# Patient Record
Sex: FEMALE | Race: WHITE
[De-identification: names, ages, dates, MRNs, and addresses within clinical notes are randomized per-mention and may not be internally consistent; named-entity substitution may affect disease eponyms.]

---

## 2021-10-27 ENCOUNTER — HOSPITAL ENCOUNTER (INPATIENT)
Dept: HOSPITAL 33 - ED | Age: 76
LOS: 5 days | Discharge: NURSING, REHAB OR LONG-TERM CARE | DRG: 638 | End: 2021-11-01
Attending: FAMILY MEDICINE | Admitting: FAMILY MEDICINE
Payer: MEDICARE

## 2021-10-27 DIAGNOSIS — Z79.899: ICD-10-CM

## 2021-10-27 DIAGNOSIS — R10.9: ICD-10-CM

## 2021-10-27 DIAGNOSIS — E87.6: ICD-10-CM

## 2021-10-27 DIAGNOSIS — R11.2: ICD-10-CM

## 2021-10-27 DIAGNOSIS — E11.00: Primary | ICD-10-CM

## 2021-10-27 DIAGNOSIS — F69: ICD-10-CM

## 2021-10-27 DIAGNOSIS — Z86.73: ICD-10-CM

## 2021-10-27 DIAGNOSIS — E78.5: ICD-10-CM

## 2021-10-27 DIAGNOSIS — Z79.4: ICD-10-CM

## 2021-10-27 DIAGNOSIS — I10: ICD-10-CM

## 2021-10-27 DIAGNOSIS — R10.84: ICD-10-CM

## 2021-10-27 DIAGNOSIS — Z20.822: ICD-10-CM

## 2021-10-27 DIAGNOSIS — E87.0: ICD-10-CM

## 2021-10-27 DIAGNOSIS — E11.65: ICD-10-CM

## 2021-10-27 LAB
ALBUMIN SERPL-MCNC: 4.7 G/DL (ref 3.5–5)
ALP SERPL-CCNC: 180 U/L (ref 38–126)
ALT SERPL-CCNC: 38 U/L (ref 0–35)
ANION GAP SERPL CALC-SCNC: 10.6 MEQ/L (ref 5–15)
ANION GAP SERPL CALC-SCNC: 14.1 MEQ/L (ref 5–15)
ANION GAP SERPL CALC-SCNC: 25 MEQ/L (ref 5–15)
ANION GAP SERPL CALC-SCNC: 34.9 MEQ/L (ref 5–15)
ARTERIAL PATENCY WRIST A: YES
AST SERPL QL: 27 U/L (ref 14–36)
BASE EXCESS BLDA CALC-SCNC: 8.1 MMOL/L (ref -2–2)
BASE EXCESS BLDV CALC-SCNC: -7 MMOL/L (ref -2–2)
BASOPHILS # BLD AUTO: 0.03 10*3/UL (ref 0–0.4)
BASOPHILS NFR BLD AUTO: 0.2 % (ref 0–0.4)
BILIRUB BLD-MCNC: 0.7 MG/DL (ref 0.2–1.3)
BUN SERPL-MCNC: 32 MG/DL (ref 7–17)
BUN SERPL-MCNC: 34 MG/DL (ref 7–17)
BUN SERPL-MCNC: 34 MG/DL (ref 7–17)
BUN SERPL-MCNC: 37 MG/DL (ref 7–17)
CALCIUM SPEC-MCNC: 10 MG/DL (ref 8.4–10.2)
CALCIUM SPEC-MCNC: 8.7 MG/DL (ref 8.4–10.2)
CALCIUM SPEC-MCNC: 8.8 MG/DL (ref 8.4–10.2)
CALCIUM SPEC-MCNC: 9 MG/DL (ref 8.4–10.2)
CHLORIDE SERPL-SCNC: 103 MMOL/L (ref 98–107)
CHLORIDE SERPL-SCNC: 105 MMOL/L (ref 98–107)
CHLORIDE SERPL-SCNC: 106 MMOL/L (ref 98–107)
CHLORIDE SERPL-SCNC: 93 MMOL/L (ref 98–107)
CO2 SERPL-SCNC: 18 MMOL/L (ref 22–30)
CO2 SERPL-SCNC: 20 MMOL/L (ref 22–30)
CO2 SERPL-SCNC: 31 MMOL/L (ref 22–30)
CO2 SERPL-SCNC: 33 MMOL/L (ref 22–30)
COHGB BLD-MCNC: 1.4 % THGB (ref 0–6.9)
COHGB MFR BLDV: 6.9 % T HGB (ref 0–6.9)
CREAT SERPL-MCNC: 0.78 MG/DL (ref 0.52–1.04)
CREAT SERPL-MCNC: 0.85 MG/DL (ref 0.52–1.04)
CREAT SERPL-MCNC: 0.93 MG/DL (ref 0.52–1.04)
CREAT SERPL-MCNC: 1.04 MG/DL (ref 0.52–1.04)
EOSINOPHIL # BLD AUTO: 0.01 10*3/UL (ref 0–0.5)
GAS PNL BLDA: 11.4
GAS PNL BLDA: 37 C
GFR SERPLBLD BASED ON 1.73 SQ M-ARVRAT: 54.5 ML/MIN
GFR SERPLBLD BASED ON 1.73 SQ M-ARVRAT: > 60 ML/MIN
GLUCOSE SERPL-MCNC: 138 MG/DL (ref 74–106)
GLUCOSE SERPL-MCNC: 234 MG/DL (ref 74–106)
GLUCOSE SERPL-MCNC: 503 MG/DL (ref 74–106)
GLUCOSE SERPL-MCNC: 713 MG/DL (ref 74–106)
GLUCOSE UR-MCNC: >=500 MG/DL
GLUCOSE UR-MCNC: >=500 MG/DL
HCO3 BLDA-SCNC: 33.4 MMOL/L (ref 22–28)
HCO3 BLDV-SCNC: 19.7 MEQ/L (ref 22–28)
HCT VFR BLD AUTO: 40.4 % (ref 35–47)
HGB BLD-MCNC: 12.6 GM/DL (ref 12–16)
HGB BLDV-MCNC: 13.2 G/DL
INHALED O2 CONCENTRATION: 21 %
INHALED O2 CONCENTRATION: 21 %
LYMPHOCYTES # SPEC AUTO: 0.98 10*3/UL (ref 1–4.6)
MAGNESIUM SERPL-MCNC: 2.3 MG/DL (ref 1.6–2.3)
MAGNESIUM SERPL-MCNC: 2.3 MG/DL (ref 1.6–2.3)
MCH RBC QN AUTO: 30.1 PG (ref 26–32)
MCHC RBC AUTO-ENTMCNC: 31.2 G/DL (ref 32–36)
METHGB MFR BLDA: 0.9 % (ref 1.4–1.5)
MONOCYTES # BLD AUTO: 1 10*3/UL (ref 0–1.3)
O2 A-A PPRESDIFF RESPIRATORY: 25 MM[HG]
PCO2 BLDA: 48 MMHG (ref 35–45)
PCO2 BLDV: 43 MM/HG (ref 42–55)
PHOSPHATE SERPL-SCNC: 2.3 MG/DL (ref 2.5–4.5)
PLATELET # BLD AUTO: 204 K/MM3 (ref 150–450)
PO2 BLDA: 65 MMHG (ref 75–100)
PO2 BLDV: 44 MM/HG (ref 25–40)
POTASSIUM BLDA-SCNC: 3.7 MMOL/L (ref 3.5–5.1)
POTASSIUM BLDV-SCNC: 6.8 MMOL/L (ref 3.5–5.1)
POTASSIUM SERPLBLD-SCNC: 4.4 MMOL/L (ref 3.5–5.1)
POTASSIUM SERPLBLD-SCNC: 4.4 MMOL/L (ref 3.5–5.1)
POTASSIUM SERPLBLD-SCNC: 4.5 MMOL/L (ref 3.5–5.1)
POTASSIUM SERPLBLD-SCNC: 5.3 MMOL/L (ref 3.5–5.1)
PROT SERPL-MCNC: 7.4 G/DL (ref 6.3–8.2)
PROT UR STRIP-MCNC: 30 MG/DL
PROT UR STRIP-MCNC: 30 MG/DL
RBC # BLD AUTO: 4.19 M/MM3 (ref 4.1–5.4)
RBC #/AREA URNS HPF: (no result) /HPF (ref 0–2)
RBC #/AREA URNS HPF: (no result) /HPF (ref 0–2)
SAO2 % BLDA: 93 G/DF (ref 94–100)
SAO2 % BLDA: 95.2 % (ref 95–100)
SAO2 % BLDV: 75.9 % (ref 95–100)
SODIUM SERPL-SCNC: 142 MMOL/L (ref 137–145)
SODIUM SERPL-SCNC: 142 MMOL/L (ref 137–145)
SODIUM SERPL-SCNC: 145 MMOL/L (ref 137–145)
SODIUM SERPL-SCNC: 145 MMOL/L (ref 137–145)
SPECIMEN SOURCE: (no result)
WBC # BLD AUTO: 19.4 K/MM3 (ref 4–10.5)
WBC #/AREA URNS HPF: (no result) /HPF (ref 0–5)
WBC #/AREA URNS HPF: (no result) /HPF (ref 0–5)

## 2021-10-27 PROCEDURE — 74176 CT ABD & PELVIS W/O CONTRAST: CPT

## 2021-10-27 PROCEDURE — 82805 BLOOD GASES W/O2 SATURATION: CPT

## 2021-10-27 PROCEDURE — 83605 ASSAY OF LACTIC ACID: CPT

## 2021-10-27 PROCEDURE — 99285 EMERGENCY DEPT VISIT HI MDM: CPT

## 2021-10-27 PROCEDURE — 83036 HEMOGLOBIN GLYCOSYLATED A1C: CPT

## 2021-10-27 PROCEDURE — 84100 ASSAY OF PHOSPHORUS: CPT

## 2021-10-27 PROCEDURE — 92610 EVALUATE SWALLOWING FUNCTION: CPT

## 2021-10-27 PROCEDURE — 80048 BASIC METABOLIC PNL TOTAL CA: CPT

## 2021-10-27 PROCEDURE — 36000 PLACE NEEDLE IN VEIN: CPT

## 2021-10-27 PROCEDURE — 82947 ASSAY GLUCOSE BLOOD QUANT: CPT

## 2021-10-27 PROCEDURE — 87086 URINE CULTURE/COLONY COUNT: CPT

## 2021-10-27 PROCEDURE — 70450 CT HEAD/BRAIN W/O DYE: CPT

## 2021-10-27 PROCEDURE — 84133 ASSAY OF URINE POTASSIUM: CPT

## 2021-10-27 PROCEDURE — 83935 ASSAY OF URINE OSMOLALITY: CPT

## 2021-10-27 PROCEDURE — 96365 THER/PROPH/DIAG IV INF INIT: CPT

## 2021-10-27 PROCEDURE — 96374 THER/PROPH/DIAG INJ IV PUSH: CPT

## 2021-10-27 PROCEDURE — 85025 COMPLETE CBC W/AUTO DIFF WBC: CPT

## 2021-10-27 PROCEDURE — 96361 HYDRATE IV INFUSION ADD-ON: CPT

## 2021-10-27 PROCEDURE — 84484 ASSAY OF TROPONIN QUANT: CPT

## 2021-10-27 PROCEDURE — 83690 ASSAY OF LIPASE: CPT

## 2021-10-27 PROCEDURE — 99291 CRITICAL CARE FIRST HOUR: CPT

## 2021-10-27 PROCEDURE — 83930 ASSAY OF BLOOD OSMOLALITY: CPT

## 2021-10-27 PROCEDURE — 96360 HYDRATION IV INFUSION INIT: CPT

## 2021-10-27 PROCEDURE — 71045 X-RAY EXAM CHEST 1 VIEW: CPT

## 2021-10-27 PROCEDURE — 80053 COMPREHEN METABOLIC PANEL: CPT

## 2021-10-27 PROCEDURE — 82803 BLOOD GASES ANY COMBINATION: CPT

## 2021-10-27 PROCEDURE — 84300 ASSAY OF URINE SODIUM: CPT

## 2021-10-27 PROCEDURE — 83735 ASSAY OF MAGNESIUM: CPT

## 2021-10-27 PROCEDURE — 96375 TX/PRO/DX INJ NEW DRUG ADDON: CPT

## 2021-10-27 PROCEDURE — 36600 WITHDRAWAL OF ARTERIAL BLOOD: CPT

## 2021-10-27 PROCEDURE — 82375 ASSAY CARBOXYHB QUANT: CPT

## 2021-10-27 PROCEDURE — 36415 COLL VENOUS BLD VENIPUNCTURE: CPT

## 2021-10-27 PROCEDURE — 82308 ASSAY OF CALCITONIN: CPT

## 2021-10-27 PROCEDURE — 85027 COMPLETE CBC AUTOMATED: CPT

## 2021-10-27 PROCEDURE — 81001 URINALYSIS AUTO W/SCOPE: CPT

## 2021-10-27 PROCEDURE — 94760 N-INVAS EAR/PLS OXIMETRY 1: CPT

## 2021-10-27 PROCEDURE — U0003 INFECTIOUS AGENT DETECTION BY NUCLEIC ACID (DNA OR RNA); SEVERE ACUTE RESPIRATORY SYNDROME CORONAVIRUS 2 (SARS-COV-2) (CORONAVIRUS DISEASE [COVID-19]), AMPLIFIED PROBE TECHNIQUE, MAKING USE OF HIGH THROUGHPUT TECHNOLOGIES AS DESCRIBED BY CMS-2020-01-R: HCPCS

## 2021-10-27 RX ADMIN — POTASSIUM CHLORIDE, DEXTROSE MONOHYDRATE AND SODIUM CHLORIDE SCH MLS/HR: 150; 5; 450 INJECTION, SOLUTION INTRAVENOUS at 20:39

## 2021-10-27 RX ADMIN — PANTOPRAZOLE SODIUM SCH MG: 40 INJECTION, POWDER, FOR SOLUTION INTRAVENOUS at 09:50

## 2021-10-27 RX ADMIN — CEFEPIME HYDROCHLORIDE SCH MLS/HR: 2 INJECTION, POWDER, FOR SOLUTION INTRAVENOUS at 12:25

## 2021-10-27 RX ADMIN — ONDANSETRON PRN MG: 2 INJECTION, SOLUTION INTRAMUSCULAR; INTRAVENOUS at 19:46

## 2021-10-27 RX ADMIN — POTASSIUM CHLORIDE, DEXTROSE MONOHYDRATE AND SODIUM CHLORIDE SCH MLS/HR: 150; 5; 450 INJECTION, SOLUTION INTRAVENOUS at 13:36

## 2021-10-27 RX ADMIN — ENOXAPARIN SODIUM SCH MG: 100 INJECTION SUBCUTANEOUS at 21:40

## 2021-10-27 RX ADMIN — CEFEPIME HYDROCHLORIDE SCH MLS/HR: 2 INJECTION, POWDER, FOR SOLUTION INTRAVENOUS at 18:23

## 2021-10-27 RX ADMIN — HYDRALAZINE HYDROCHLORIDE PRN MG: 20 INJECTION INTRAMUSCULAR; INTRAVENOUS at 22:36

## 2021-10-27 NOTE — XRAY
Indication: Cough.



Comparison: February 4, 2020.



Portable chest remains hyperinflated and clear.  Heart not enlarged for AP

portable technique.  Bony thorax intact again with osteopenia, degenerative

changes, and scoliosis.  No new/acute findings.

## 2021-10-27 NOTE — XRAY
Indication: Abdomen pain, nausea, and vomiting.  Dementia.



Multiple contiguous axial images obtained through the abdomen and pelvis

without contrast.



Comparison: None



Lung bases are clear of infiltrate and effusion.  Heart borderline enlarged.



Noncontrasted stomach and bowel loops appear nonobstructed.  Sigmoid

diverticulosis without diverticulitis.  Minimal/mild scattered colonic fecal

debris greatest in the sigmoid with mild rectal impaction.  2.8 cm left renal

exophytic cyst.  Urinary bladder is moderately distended concerning for all

objection versus neurogenic bladder.  No free fluid/air.



Remaining liver, gallbladder, pancreas, spleen, adrenal glands, kidneys,

ureters, bladder, and uterus are unremarkable for noncontrast exam.  Moderate

scattered aortoiliac calcifications without AAA.



Osseous structures intact with mild osteopenia, mild degenerative changes

throughout the spine, and inferior L2 Schmorl node.  Small left inguinal

hernia with knuckle of small bowel loop partially herniating.



Impression:

1.  Mild fecal stasis with mild rectal impaction.

2.  Incidental borderline cardiomegaly, sigmoid diverticulosis, left renal

cyst, chronic bony findings, and small left inguinal hernia.

3.  Remaining CT abdomen/pelvis without contrast exam is negative.



Comment: Preliminary interpretation made by [facility].  No critical discrepancy.

## 2021-10-27 NOTE — ERPHSYRPT
- History of Present Illness


Time Seen by Provider: 10/27/21 04:34


Historian: patient, EMS, nursing home records


Exam Limitations: clinical condition


Physician History: 





76 years old female with history of type 1 diabetes mellitus, hypertension, 

hyperlipidemia, dementia with baseline confusion is brought in the ER with chief

complaint of nausea vomiting with elevated blood glucose and refusal to take 

insulin.  As per report patient has multiple episodes of vomiting and 

generalized abdominal pain without any distention.  No diarrhea reported.  No 

fever or chills.  No difficulty breathing.  Patient blood glucose is 525 on 

presentation in the ER.  Further history is limited secondary to her condition.


Allergies/Adverse Reactions: 








No Known Drug Allergies Allergy (Unverified 10/27/21 04:56)


   





Home Medications: 








Acetaminophen 325 mg*** [Tylenol 325 mg***] 650 mg PO Q4HPRN PRN 10/27/21 

[History]


Aspirin EC 81 mg*** [Ecotrin 81 mg***] 81 mg PO DAILY 10/27/21 [History]


Atorvastatin Calcium 40 mg PO HS 10/27/21 [History]


Clonidine HCl 0.2 mg PO BID 10/27/21 [History]


Donepezil HCl 10 mg*** [Aricept 10 MG***] 10 mg PO HS 10/27/21 [History]


Glucagon 1 mg*** [GlucaGen 1 MG***] 1 mg IM UD 10/27/21 [History]


Hydralazine HCl 10 mg PO TID 10/27/21 [History]


Insulin Glargine** [Lantus Insulin**] 6 units SQ HS 10/27/21 [History]


Insulin Lispro [Humalog] 8 units SQ 1200,1700 10/27/21 [History]


Insulin Lispro [Humalog] 18 unit SQ 0800 10/27/21 [History]


Lisinopril 40 mg PO DAILY 10/27/21 [History]


Magnesium Hydroxide 30 ml*** [Milk of Magnesia 30 ml***] 30 ml PO DAILY PRN PRN 

10/27/21 [History]


Magnesium Oxide [Mag-Oxide] 200 mg PO DAILY 10/27/21 [History]


Memantine HCl 5 mg PO UD 10/27/21 [History]


Metoprolol Succinate 50 mg*** [Toprol Xl 50 MG***] 50 mg PO BID 10/27/21 

[History]


Multivit,Calc,Mins/Iron/Folic [Therems-M Tablet] 1 tab PO DAILY 10/27/21 

[History]


Nitroglycerin 0.4 mg Tablet*** [Nitrostat 0.4 MG Tablet***] 0.4 mg SL Q5MIN PRN 

MR X 3 PRN 10/27/21 [History]


Ondansetron ODT 4 MG*** [Zofran Odt 4 mg***] 4 mg PO Q6H PRN PRN 10/27/21 

[History]


Potassium Chloride 10 Meq Tab* [Klor Con 10 MEQ***] 10 meq PO DAILY 10/27/21 

[History]


Sertraline HCl 50 mg** [Zoloft 50 mg Tablet**] 50 mg PO HS 10/27/21 [History]


Vit A/Vit C/Vit E/Zinc/Copper [Preservision Areds Softgel] 1 cap PO DAILY 

10/27/21 [History]








- Review of Systems


All Other Systems: Unable due to dementia





- Nursing Vital Signs


Nursing Vital Signs: 


                               Initial Vital Signs











Temperature  97.9 F   10/27/21 04:20


 


Pulse Rate  91 H  10/27/21 04:20


 


Respiratory Rate  20   10/27/21 04:20


 


Blood Pressure  137/90   10/27/21 04:20


 


O2 Sat by Pulse Oximetry  97   10/27/21 04:20








                                   Pain Scale











Pain Intensity                 0

















- Physical Exam


General Appearance: no apparent distress, alert


Eye Exam: PERRL/EOMI


Ears, Nose, Throat Exam: normal ENT inspection, TMs normal


Neck Exam: normal inspection, non-tender, supple


Respiratory Exam: normal breath sounds, lungs clear


Cardiovascular Exam: regular rate/rhythm, normal heart sounds


Gastrointestinal/Abdomen Exam: soft, normal bowel sounds, tenderness 

(Generalized)


Back Exam: normal inspection


Extremity Exam: normal inspection, pelvis stable


Neurologic Exam: alert, sensation nml, No oriented x 3, No normal mood/affect, 

No motor deficits


Skin Exam: normal color


**SpO2 Interpretation**: normal


SpO2: 97


O2 Delivery: Room Air





- Course


EKG Interpreted by Me: RATE (82), Sinus Rhythm, NORMAL AXIS, prolonged QT 

interval, Other (ST depression in lateral leads/inferior leads.)


Ordered Tests: 


                               Active Orders 24 hr











 Category Date Time Status


 


 CBC W DIFF AM.LAB Lab  10/28/21 04:15 Completed


 


 CMP AM.LAB Lab  10/28/21 04:15 Completed








Medication Summary











Generic Name Dose Route Start Last Admin





  Trade Name Freq  PRN Reason Stop Dose Admin


 


Acetaminophen  650 mg  10/27/21 20:21  10/27/21 20:40





  Acetaminophen 650 Mg Supp.Rect  TN  11/26/21 20:20  650 mg





  Q6H PRN PRN   Administration





  PAIN AND/OR FEVER  


 


Enoxaparin Sodium  40 mg  10/27/21 22:00  10/28/21 08:59





  Enoxaparin Sodium*** 40 Mg/0.4 Ml Syringe  SQ  11/26/21 21:59  40 mg





  Q12H ALLEN   Administration


 


Hydralazine HCl  20 mg  10/27/21 20:23  10/28/21 17:03





  Hydralazine Hcl 20 Mg/Ml Vial  IV  11/26/21 20:22  20 mg





  Q6H PRN PRN   Administration





  HYPERTENSION  


 


Piperacillin Sod/Tazobactam  100 mls @ 200 mls/hr  10/27/21 12:00  10/28/21 

17:08





  Sod 2.25 gm/ Sodium Chloride  IV  11/26/21 11:59  200 mls/hr





  Q6HT ALLEN   Administration


 


Sodium Chloride  1,000 mls @ 50 mls/hr  10/28/21 03:27  10/28/21 03:36





  Sodium Chloride 0.9% 1000 Ml  IV  11/27/21 03:26  50 mls/hr





  .Q20H ALLEN   Administration


 


Insulin Human Lispro  0 unit  10/28/21 03:26  10/28/21 16:45





  Insulin Lispro 1 Unit  SQ  11/27/21 03:25  5 unit





  UD PRN   Administration





  HYPERGLYCEMIA  


 


Labetalol HCl  20 mg  10/28/21 14:00  10/28/21 12:27





  Labetalol Hcl 20 Mg/4 Ml Disp.Syringe  IV  11/27/21 03:29  20 mg





  Q8HT ALLEN   Administration


 


Ondansetron HCl  4 mg  10/27/21 08:55  10/28/21 17:34





  Ondansetron Hcl 4 Mg/2 Ml Vial  IV  11/26/21 08:54  4 mg





  Q6H PRN PRN   Administration





  NAUSEA/VOMITING  


 


Pantoprazole Sodium  40 mg  10/27/21 10:00  10/28/21 08:59





  Pantoprazole 40 Mg Vial  IV  11/26/21 09:59  40 mg





  Q24H10 ALLEN   Administration














Discontinued Medications














Generic Name Dose Route Start Last Admin





  Trade Name Qing  PRN Reason Stop Dose Admin


 


Acetaminophen  Confirm  10/27/21 20:31 





  Acetaminophen 325mg Suppository  Administered  10/27/21 20:32 





  Dose  





  650 mg  





  .ROUTE  





  .STK-MED ONE  


 


Albuterol/Ipratropium  3 ml  10/27/21 08:55 





  Ipratropium/Albuterol Sulfate 3 Ml Ampul.Neb  IH  11/26/21 08:54 





  Q4HPRN PRN  





  SHORTNESS OF BREATH/WHEEZING  


 


Calcium Gluconate  Confirm  10/27/21 05:49 





  Calcium Gluconate 1000 Mg/10 Ml Vial  Administered  10/27/21 05:50 





  Dose  





  1,000 mg  





  IV  





  .STK-MED ONE  


 


Dextrose  25 ml  10/27/21 18:03  10/27/21 18:04





  Dextrose 50%-Water 50 Ml Abboject  IV  10/27/21 18:04  25 ml





  STAT ONE   Administration


 


Dextrose  Confirm  10/27/21 18:04 





  Dextrose 50%-Water 50 Ml Abboject  Administered  10/27/21 18:05 





  Dose  





  50 ml  





  IV  





  .STK-MED ONE  


 


Enoxaparin Sodium  40 mg  10/27/21 10:00  10/27/21 09:50





  Enoxaparin Sodium*** 40 Mg/0.4 Ml Syringe  SQ  11/26/21 09:59  40 mg





  DAILY ALLEN   Administration


 


Enoxaparin Sodium  40 mg  10/27/21 22:00 





  Enoxaparin Sodium*** 40 Mg/0.4 Ml Syringe  SQ  11/26/21 21:59 





  DAILY ALLEN  


 


Sodium Chloride  1,000 mls @ 999 mls/hr  10/27/21 04:42  10/27/21 06:53





  Sodium Chloride 0.9% 1000 Ml  IV  10/27/21 05:42  Infused





  .Q1H1M STA   Infusion


 


Sodium Chloride  500 mls @ 500 mls/hr  10/27/21 05:22  10/27/21 06:52





  Sodium Chloride 0.9% 500 Ml  IV  10/27/21 06:21  Infused





  .Q1H ONE   Infusion


 


Sodium Chloride  Confirm  10/27/21 05:23 





  Sodium Chloride 0.9% 1000 Ml  Administered  10/27/21 05:24 





  Dose  





  1,000 mls @ ud  





  .ROUTE  





  .STK-MED ONE  


 


Sodium Chloride  500 mls @ 500 mls/hr  10/27/21 06:17  10/27/21 06:53





  Sodium Chloride 0.9% 500 Ml  IV  10/27/21 07:16  Infused





  .Q1H ONE   Infusion


 


Sodium Chloride  Confirm  10/27/21 06:18 





  Sodium Chloride 0.9% 500 Ml  Administered  10/27/21 06:19 





  Dose  





  500 mls @ ud  





  IV  





  .STK-MED ONE  


 


Sodium Chloride  Confirm  10/27/21 06:25 





  Sodium Chloride 0.9% 100 Ml Bag  Administered  10/27/21 06:26 





  Dose  





  100 mls @ ud  





  .ROUTE  





  .STK-MED ONE  


 


Piperacillin Sod/Tazobactam  100 mls @ 200 mls/hr  10/27/21 06:29  10/27/21 

06:40





  Sod 3.375 gm/ Sodium Chloride  IV  10/27/21 06:58  200 mls/hr





  STAT ONE   Administration


 


Insulin Human Regular 100 unit  100 mls @ 4.581 mls/hr  10/27/21 06:29  10/27/21

 06:39





  / Sodium Chloride  IV  10/28/21 04:18  0.1 unit/kg/hr





  .[id number] STA   4.581 mls/hr





    Administration





  Protocol  





  0.1 UNIT/KG/HR  


 


Sodium Chloride  Confirm  10/27/21 06:34 





  Sodium Chloride 100ml Mini-Bag Plus  Administered  10/27/21 06:35 





  Dose  





  100 mls @ ud  





  IV  





  .STK-MED ONE  


 


Sodium Chloride  1,000 mls @ 100 mls/hr  10/27/21 07:00  10/27/21 06:40





  Sodium Chloride 0.45% 1000 Ml  IV  11/26/21 06:59  100 mls/hr





  .Q10H ALLEN   Administration


 


Sodium Chloride  Confirm  10/27/21 06:36 





  Sodium Chloride 0.45% 1000 Ml  Administered  10/27/21 06:37 





  Dose  





  1,000 mls @ ud  





  IV  





  .STK-MED ONE  


 


Piperacillin Sod/Tazobactam  100 mls @ 200 mls/hr  10/27/21 12:00 





  Sod 3.375 gm/ Sodium Chloride  IV  10/30/21 11:59 





  Q6HT ALLEN  


 


Insulin Human Regular 100 unit  100 mls @ 4.581 mls/hr  10/27/21 09:30  10/27/21

 18:00





  / Sodium Chloride  IV  11/26/21 06:28  0.03 unit/kg/hr





  .[id number] ALLEN   1.5 mls/hr





    Titration





  Protocol  





  0.1 UNIT/KG/HR  


 


Sodium Chloride  1,000 mls @ 100 mls/hr  10/27/21 10:00  10/27/21 18:51





  Sodium Chloride 0.9% 1000 Ml  IV  11/26/21 09:59  Not Given





  .Q10H ALLEN  


 


Potassium Chloride/Dextrose/Sod Cl  1,000 mls @ 100 mls/hr  10/27/21 13:30  

10/27/21 20:39





  D5w/0.45ns W/ 20meq Kcl 1000 Ml  IV  11/26/21 13:29  150 mls/hr





  .Q10H ALLEN   Administration


 


Insulin Glargine  10 unit  10/28/21 18:45  10/27/21 18:43





  Insulin Glargine** 1 Unit  SQ  10/28/21 18:46  10 unit





  STAT ONE   Administration


 


Insulin Glargine  Confirm  10/27/21 18:43 





  Insulin Glargine** 1 Unit  Administered  10/27/21 18:44 





  Dose  





  10 unit  





  .ROUTE  





  .STK-MED ONE  


 


Insulin Human Regular  Confirm  10/27/21 06:25 





  Insulin Regular, Human 1 Unit  Administered  10/27/21 06:26 





  Dose  





  105 unit  





  .ROUTE  





  .STK-MED ONE  


 


Insulin Human Regular  4.5 unit  10/27/21 06:31  10/27/21 06:40





  Insulin Regular, Human 1 Unit  IV  10/27/21 06:32  4.5 unit





  STAT ONE   Administration


 


Labetalol HCl  10 mg  10/27/21 21:00  10/27/21 20:36





  Labetalol Hcl 20 Mg/4 Ml Disp.Syringe  IV  11/26/21 20:59  10 mg





  Q8H ALLEN   Administration


 


Labetalol HCl  20 mg  10/28/21 03:30  10/28/21 03:35





  Labetalol Hcl 20 Mg/4 Ml Disp.Syringe  IV  11/27/21 03:29  20 mg





  Q8H ALLEN   Administration


 


Labetalol HCl  Confirm  10/27/21 20:32 





  Labetalol Hcl 20 Mg/4 Ml Disp.Syringe  Administered  10/27/21 20:33 





  Dose  





  20 mg  





  IV  





  .STK-MED ONE  


 


Ondansetron HCl  4 mg  10/27/21 04:42  10/27/21 05:23





  Ondansetron Hcl 4 Mg/2 Ml Vial  IV  10/27/21 04:43  4 mg





  STAT ONE   Administration


 


Ondansetron HCl  Confirm  10/27/21 05:23 





  Ondansetron Hcl 4 Mg/2 Ml Vial  Administered  10/27/21 05:24 





  Dose  





  4 mg  





  .ROUTE  





  .STK-MED ONE  


 


Piperacillin Sod/Tazobactam Sod  Confirm  10/27/21 06:34 





  Piperacillin/Tazobactam Sodium 3.375 Gm Vial  Administered  10/27/21 06:35 





  Dose  





  3.375 gm  





  IV  





  .STK-MED ONE  











Lab/Rad Data: 


                           Laboratory Result Diagrams





                                 10/27/21 05:20 





                                 10/27/21 05:20 





                               Laboratory Results











  10/27/21 10/27/21 10/27/21 Range/Units





  08:38 07:38 07:02 


 


WBC     (4.0-10.5)  K/mm3


 


RBC     (4.1-5.4)  M/mm3


 


Hgb     (12.0-16.0)  gm/dl


 


Hct     (35-47)  %


 


MCV     ()  fl


 


MCH     (26-32)  pg


 


MCHC     (32-36)  g/dl


 


RDW     (11.5-14.0)  %


 


Plt Count     (150-450)  K/mm3


 


MPV     (7.5-11.0)  fl


 


Gran %     (36.0-66.0)  %


 


Eos # (Auto)     (0-0.5)  


 


Absolute Lymphs (auto)     (1.0-4.6)  


 


Absolute Monos (auto)     (0.0-1.3)  


 


Lymphocytes %     (24.0-44.0)  %


 


Monocytes %     (0.0-12.0)  %


 


Eosinophils %     (0.00-5.0)  %


 


Basophils %     (0.0-0.4)  %


 


Absolute Granulocytes     (1.4-6.9)  


 


Basophils #     (0-0.4)  


 


pO2/FiO2 Ratio     %


 


VBG pH     (7.32-7.42)  


 


VBG pCO2 at Pat Temp     (42-55)  mm/Hg


 


VBG pO2 at Pat Temp     (25-40)  mm/Hg


 


VBG HCO3     (22-28)  meq/L


 


VBG O2 Sat (Radha)     ()  


 


VBG Base Excess     (-2.0-2.0)  


 


VBG Hemoglobin     


 


VBG Carboxyhemoglobin     (0.0-6.9)  % T HGB


 


POC Potassium     (3.5-5.1)  


 


Sodium     (137-145)  mmol/L


 


Potassium     (3.5-5.1)  mmol/L


 


Chloride     ()  mmol/L


 


Carbon Dioxide     (22-30)  mmol/L


 


Anion Gap     (5-15)  MEQ/L


 


BUN     (7-17)  mg/dL


 


Creatinine     (0.52-1.04)  mg/dL


 


Estimated GFR     ML/MIN


 


Glucose     ()  mg/dL


 


POC Glucometer  491 H    (74 to 106)  mg/dL


 


Lactic Acid   3.5 H   (0.4-2.0)  


 


Calcium     (8.4-10.2)  mg/dL


 


Phosphorus     (2.5-4.5)  mg/dL


 


Magnesium     (1.6-2.3)  mg/dL


 


Total Bilirubin     (0.2-1.3)  mg/dL


 


AST     (14-36)  U/L


 


ALT     (0-35)  U/L


 


Alkaline Phosphatase     ()  U/L


 


Troponin I     (0.000-0.034)  ng/mL


 


Serum Total Protein     (6.3-8.2)  g/dL


 


Albumin     (3.5-5.0)  g/dL


 


Lipase     ()  U/L


 


Urine Color     (YELLOW)  


 


Urine Appearance     (CLEAR)  


 


Urine pH     (5-6)  


 


Ur Specific Gravity     (1.005-1.025)  


 


Urine Protein     (Negative)  


 


Urine Ketones     (NEGATIVE)  


 


Urine Blood     (0-5)  Jose/ul


 


Urine Nitrite     (NEGATIVE)  


 


Urine Bilirubin     (NEGATIVE)  


 


Urine Urobilinogen     (0-1)  mg/dL


 


Ur Leukocyte Esterase     (NEGATIVE)  


 


Urine WBC (Auto)     (0-5)  /HPF


 


Urine RBC (Auto)     (0-2)  /HPF


 


U Epithel Cells (Auto)     (FEW)  /HPF


 


Urine Bacteria (Auto)     (NEGATIVE)  /HPF


 


Urine Culture Reflexed     (NO)  


 


Urine Glucose     (NEGATIVE)  mg/dL


 


SARS-CoV-2 (PCR)    NEGATIVE  (NEGATIVE)  














  10/27/21 10/27/21 10/27/21 Range/Units





  05:30 05:30 05:30 


 


WBC     (4.0-10.5)  K/mm3


 


RBC     (4.1-5.4)  M/mm3


 


Hgb     (12.0-16.0)  gm/dl


 


Hct     (35-47)  %


 


MCV     ()  fl


 


MCH     (26-32)  pg


 


MCHC     (32-36)  g/dl


 


RDW     (11.5-14.0)  %


 


Plt Count     (150-450)  K/mm3


 


MPV     (7.5-11.0)  fl


 


Gran %     (36.0-66.0)  %


 


Eos # (Auto)     (0-0.5)  


 


Absolute Lymphs (auto)     (1.0-4.6)  


 


Absolute Monos (auto)     (0.0-1.3)  


 


Lymphocytes %     (24.0-44.0)  %


 


Monocytes %     (0.0-12.0)  %


 


Eosinophils %     (0.00-5.0)  %


 


Basophils %     (0.0-0.4)  %


 


Absolute Granulocytes     (1.4-6.9)  


 


Basophils #     (0-0.4)  


 


pO2/FiO2 Ratio    21.0  %


 


VBG pH    7.27 L  (7.32-7.42)  


 


VBG pCO2 at Pat Temp    43  (42-55)  mm/Hg


 


VBG pO2 at Pat Temp    44 H  (25-40)  mm/Hg


 


VBG HCO3    19.7 L  (22-28)  meq/L


 


VBG O2 Sat (Radha)    75.9 L  ()  


 


VBG Base Excess    -7.0 L  (-2.0-2.0)  


 


VBG Hemoglobin    13.2  


 


VBG Carboxyhemoglobin    6.9  (0.0-6.9)  % T HGB


 


POC Potassium    6.8 H*  (3.5-5.1)  


 


Sodium     (137-145)  mmol/L


 


Potassium     (3.5-5.1)  mmol/L


 


Chloride     ()  mmol/L


 


Carbon Dioxide     (22-30)  mmol/L


 


Anion Gap     (5-15)  MEQ/L


 


BUN     (7-17)  mg/dL


 


Creatinine     (0.52-1.04)  mg/dL


 


Estimated GFR     ML/MIN


 


Glucose     ()  mg/dL


 


POC Glucometer     (74 to 106)  mg/dL


 


Lactic Acid     (0.4-2.0)  


 


Calcium     (8.4-10.2)  mg/dL


 


Phosphorus  7.0 H    (2.5-4.5)  mg/dL


 


Magnesium     (1.6-2.3)  mg/dL


 


Total Bilirubin     (0.2-1.3)  mg/dL


 


AST     (14-36)  U/L


 


ALT     (0-35)  U/L


 


Alkaline Phosphatase     ()  U/L


 


Troponin I   0.044 H*   (0.000-0.034)  ng/mL


 


Serum Total Protein     (6.3-8.2)  g/dL


 


Albumin     (3.5-5.0)  g/dL


 


Lipase     ()  U/L


 


Urine Color     (YELLOW)  


 


Urine Appearance     (CLEAR)  


 


Urine pH     (5-6)  


 


Ur Specific Gravity     (1.005-1.025)  


 


Urine Protein     (Negative)  


 


Urine Ketones     (NEGATIVE)  


 


Urine Blood     (0-5)  Jose/ul


 


Urine Nitrite     (NEGATIVE)  


 


Urine Bilirubin     (NEGATIVE)  


 


Urine Urobilinogen     (0-1)  mg/dL


 


Ur Leukocyte Esterase     (NEGATIVE)  


 


Urine WBC (Auto)     (0-5)  /HPF


 


Urine RBC (Auto)     (0-2)  /HPF


 


U Epithel Cells (Auto)     (FEW)  /HPF


 


Urine Bacteria (Auto)     (NEGATIVE)  /HPF


 


Urine Culture Reflexed     (NO)  


 


Urine Glucose     (NEGATIVE)  mg/dL


 


SARS-CoV-2 (PCR)     (NEGATIVE)  














  10/27/21 10/27/21 10/27/21 Range/Units





  05:30 05:20 05:20 


 


WBC     (4.0-10.5)  K/mm3


 


RBC     (4.1-5.4)  M/mm3


 


Hgb     (12.0-16.0)  gm/dl


 


Hct     (35-47)  %


 


MCV     ()  fl


 


MCH     (26-32)  pg


 


MCHC     (32-36)  g/dl


 


RDW     (11.5-14.0)  %


 


Plt Count     (150-450)  K/mm3


 


MPV     (7.5-11.0)  fl


 


Gran %     (36.0-66.0)  %


 


Eos # (Auto)     (0-0.5)  


 


Absolute Lymphs (auto)     (1.0-4.6)  


 


Absolute Monos (auto)     (0.0-1.3)  


 


Lymphocytes %     (24.0-44.0)  %


 


Monocytes %     (0.0-12.0)  %


 


Eosinophils %     (0.00-5.0)  %


 


Basophils %     (0.0-0.4)  %


 


Absolute Granulocytes     (1.4-6.9)  


 


Basophils #     (0-0.4)  


 


pO2/FiO2 Ratio     %


 


VBG pH     (7.32-7.42)  


 


VBG pCO2 at Pat Temp     (42-55)  mm/Hg


 


VBG pO2 at Pat Temp     (25-40)  mm/Hg


 


VBG HCO3     (22-28)  meq/L


 


VBG O2 Sat (Radha)     ()  


 


VBG Base Excess     (-2.0-2.0)  


 


VBG Hemoglobin     


 


VBG Carboxyhemoglobin     (0.0-6.9)  % T HGB


 


POC Potassium     (3.5-5.1)  


 


Sodium     (137-145)  mmol/L


 


Potassium     (3.5-5.1)  mmol/L


 


Chloride     ()  mmol/L


 


Carbon Dioxide     (22-30)  mmol/L


 


Anion Gap     (5-15)  MEQ/L


 


BUN     (7-17)  mg/dL


 


Creatinine     (0.52-1.04)  mg/dL


 


Estimated GFR     ML/MIN


 


Glucose     ()  mg/dL


 


POC Glucometer     (74 to 106)  mg/dL


 


Lactic Acid  5.1 H    (0.4-2.0)  


 


Calcium     (8.4-10.2)  mg/dL


 


Phosphorus     (2.5-4.5)  mg/dL


 


Magnesium     (1.6-2.3)  mg/dL


 


Total Bilirubin     (0.2-1.3)  mg/dL


 


AST     (14-36)  U/L


 


ALT     (0-35)  U/L


 


Alkaline Phosphatase     ()  U/L


 


Troponin I     (0.000-0.034)  ng/mL


 


Serum Total Protein     (6.3-8.2)  g/dL


 


Albumin     (3.5-5.0)  g/dL


 


Lipase    17 L  ()  U/L


 


Urine Color   YELLOW   (YELLOW)  


 


Urine Appearance   CLEAR   (CLEAR)  


 


Urine pH   5.0   (5-6)  


 


Ur Specific Gravity   1.023   (1.005-1.025)  


 


Urine Protein   30   (Negative)  


 


Urine Ketones   SMALL   (NEGATIVE)  


 


Urine Blood   NEGATIVE   (0-5)  Jose/ul


 


Urine Nitrite   NEGATIVE   (NEGATIVE)  


 


Urine Bilirubin   NEGATIVE   (NEGATIVE)  


 


Urine Urobilinogen   NEGATIVE   (0-1)  mg/dL


 


Ur Leukocyte Esterase   NEGATIVE   (NEGATIVE)  


 


Urine WBC (Auto)   NONE   (0-5)  /HPF


 


Urine RBC (Auto)   NONE   (0-2)  /HPF


 


U Epithel Cells (Auto)   NONE   (FEW)  /HPF


 


Urine Bacteria (Auto)   NONE   (NEGATIVE)  /HPF


 


Urine Culture Reflexed   NO   (NO)  


 


Urine Glucose   >=500   (NEGATIVE)  mg/dL


 


SARS-CoV-2 (PCR)     (NEGATIVE)  














  10/27/21 10/27/21 Range/Units





  05:20 05:20 


 


WBC   19.4 H  (4.0-10.5)  K/mm3


 


RBC   4.19  (4.1-5.4)  M/mm3


 


Hgb   12.6  (12.0-16.0)  gm/dl


 


Hct   40.4  (35-47)  %


 


MCV   96.4  ()  fl


 


MCH   30.1  (26-32)  pg


 


MCHC   31.2 L  (32-36)  g/dl


 


RDW   13.2  (11.5-14.0)  %


 


Plt Count   204  (150-450)  K/mm3


 


MPV   13.5 H  (7.5-11.0)  fl


 


Gran %   89.6 H  (36.0-66.0)  %


 


Eos # (Auto)   0.01  (0-0.5)  


 


Absolute Lymphs (auto)   0.98 L  (1.0-4.6)  


 


Absolute Monos (auto)   1.00  (0.0-1.3)  


 


Lymphocytes %   5.0 L  (24.0-44.0)  %


 


Monocytes %   5.1  (0.0-12.0)  %


 


Eosinophils %   0.1  (0.00-5.0)  %


 


Basophils %   0.2  (0.0-0.4)  %


 


Absolute Granulocytes   17.40 H  (1.4-6.9)  


 


Basophils #   0.03  (0-0.4)  


 


pO2/FiO2 Ratio    %


 


VBG pH    (7.32-7.42)  


 


VBG pCO2 at Pat Temp    (42-55)  mm/Hg


 


VBG pO2 at Pat Temp    (25-40)  mm/Hg


 


VBG HCO3    (22-28)  meq/L


 


VBG O2 Sat (Radha)    ()  


 


VBG Base Excess    (-2.0-2.0)  


 


VBG Hemoglobin    


 


VBG Carboxyhemoglobin    (0.0-6.9)  % T HGB


 


POC Potassium    (3.5-5.1)  


 


Sodium  142   (137-145)  mmol/L


 


Potassium  5.3 H   (3.5-5.1)  mmol/L


 


Chloride  93 L   ()  mmol/L


 


Carbon Dioxide  20 L   (22-30)  mmol/L


 


Anion Gap  34.9 H   (5-15)  MEQ/L


 


BUN  34 H   (7-17)  mg/dL


 


Creatinine  1.04   (0.52-1.04)  mg/dL


 


Estimated GFR  54.5   ML/MIN


 


Glucose  713 H*   ()  mg/dL


 


POC Glucometer    (74 to 106)  mg/dL


 


Lactic Acid    (0.4-2.0)  


 


Calcium  10.0   (8.4-10.2)  mg/dL


 


Phosphorus    (2.5-4.5)  mg/dL


 


Magnesium  2.3   (1.6-2.3)  mg/dL


 


Total Bilirubin  0.70   (0.2-1.3)  mg/dL


 


AST  27   (14-36)  U/L


 


ALT  38 H   (0-35)  U/L


 


Alkaline Phosphatase  180 H   ()  U/L


 


Troponin I    (0.000-0.034)  ng/mL


 


Serum Total Protein  7.4   (6.3-8.2)  g/dL


 


Albumin  4.7   (3.5-5.0)  g/dL


 


Lipase    ()  U/L


 


Urine Color    (YELLOW)  


 


Urine Appearance    (CLEAR)  


 


Urine pH    (5-6)  


 


Ur Specific Gravity    (1.005-1.025)  


 


Urine Protein    (Negative)  


 


Urine Ketones    (NEGATIVE)  


 


Urine Blood    (0-5)  Jose/ul


 


Urine Nitrite    (NEGATIVE)  


 


Urine Bilirubin    (NEGATIVE)  


 


Urine Urobilinogen    (0-1)  mg/dL


 


Ur Leukocyte Esterase    (NEGATIVE)  


 


Urine WBC (Auto)    (0-5)  /HPF


 


Urine RBC (Auto)    (0-2)  /HPF


 


U Epithel Cells (Auto)    (FEW)  /HPF


 


Urine Bacteria (Auto)    (NEGATIVE)  /HPF


 


Urine Culture Reflexed    (NO)  


 


Urine Glucose    (NEGATIVE)  mg/dL


 


SARS-CoV-2 (PCR)    (NEGATIVE)  














- Progress


Progress: unchanged


Progress Note: 





10/27/21 06:47


She is given fluid bolus 30/kg for lactate 5.1, work-up showed white count of 19

 and a serum glucose 713 with gap of 34, mildly low bicarb 21, pH of 7.27.  

Potassium is 5.3.  Serum osmolality is around 330.  I believe patient is in HHS.

  She is started on insulin drip per protocol.  EKG showed diffuse ST depression

 especially in the lateral leads.  Troponins are negative.  She is given a dose 

of antibiotics, no UTI.  Chest x-ray reviewed by me did not reveal any obvious 

infiltrative process or any other acute pathology.  Official report is pending. 

 Discussed with Dr. Montes, reviewed history, work-up and agreed with starting 

her on insulin drip and since her corrected sodium is more than 150s, will con

tinue with half-normal saline rather than normal saline to avoid hypernatremia. 

 Patient is admitted.











Counseled pt/family regarding: lab results, diagnosis, rad results





- Departure


Departure Disposition: In-patient Admission


Clinical Impression: 


 Hyperosmolar hyperglycemic state (HHS), Lactic acidosis





Sepsis


Qualifiers:


 Sepsis type: sepsis due to unspecified organism Sepsis acute organ dysfunction 

status: unspecified Qualified Code(s): A41.9 - Sepsis, unspecified organism





Condition: Serious


Critical Care Time: Yes


Critical Care Time(excluding separately billable procedures): Critical 30-74 

mins (56)

## 2021-10-28 LAB
ALBUMIN SERPL-MCNC: 3.3 G/DL (ref 3.5–5)
ALP SERPL-CCNC: 102 U/L (ref 38–126)
ALT SERPL-CCNC: 29 U/L (ref 0–35)
ANION GAP SERPL CALC-SCNC: 15.2 MEQ/L (ref 5–15)
AST SERPL QL: 27 U/L (ref 14–36)
BASOPHILS # BLD AUTO: 0.02 10*3/UL (ref 0–0.4)
BASOPHILS NFR BLD AUTO: 0.1 % (ref 0–0.4)
BILIRUB BLD-MCNC: 0.5 MG/DL (ref 0.2–1.3)
BUN SERPL-MCNC: 21 MG/DL (ref 7–17)
CALCIUM SPEC-MCNC: 8.4 MG/DL (ref 8.4–10.2)
CHLORIDE SERPL-SCNC: 106 MMOL/L (ref 98–107)
CO2 SERPL-SCNC: 26 MMOL/L (ref 22–30)
CREAT SERPL-MCNC: 0.63 MG/DL (ref 0.52–1.04)
EOSINOPHIL # BLD AUTO: 0 10*3/UL (ref 0–0.5)
GFR SERPLBLD BASED ON 1.73 SQ M-ARVRAT: > 60 ML/MIN
GLUCOSE SERPL-MCNC: 336 MG/DL (ref 74–106)
HCT VFR BLD AUTO: 33.9 % (ref 35–47)
HGB BLD-MCNC: 10.4 GM/DL (ref 12–16)
LYMPHOCYTES # SPEC AUTO: 1.31 10*3/UL (ref 1–4.6)
MCH RBC QN AUTO: 29.1 PG (ref 26–32)
MCHC RBC AUTO-ENTMCNC: 30.7 G/DL (ref 32–36)
MONOCYTES # BLD AUTO: 0.72 10*3/UL (ref 0–1.3)
PLATELET # BLD AUTO: 160 K/MM3 (ref 150–450)
POTASSIUM SERPLBLD-SCNC: 3.9 MMOL/L (ref 3.5–5.1)
PROT SERPL-MCNC: 6 G/DL (ref 6.3–8.2)
RBC # BLD AUTO: 3.57 M/MM3 (ref 4.1–5.4)
SODIUM SERPL-SCNC: 143 MMOL/L (ref 137–145)
WBC # BLD AUTO: 16.3 K/MM3 (ref 4–10.5)

## 2021-10-28 RX ADMIN — CEFEPIME HYDROCHLORIDE SCH MLS/HR: 2 INJECTION, POWDER, FOR SOLUTION INTRAVENOUS at 17:08

## 2021-10-28 RX ADMIN — INSULIN LISPRO PRN UNIT: 100 INJECTION, SOLUTION INTRAVENOUS; SUBCUTANEOUS at 16:45

## 2021-10-28 RX ADMIN — INSULIN LISPRO PRN UNIT: 100 INJECTION, SOLUTION INTRAVENOUS; SUBCUTANEOUS at 03:36

## 2021-10-28 RX ADMIN — CEFEPIME HYDROCHLORIDE SCH MLS/HR: 2 INJECTION, POWDER, FOR SOLUTION INTRAVENOUS at 06:04

## 2021-10-28 RX ADMIN — LABETALOL HYDROCHLORIDE SCH MG: 5 INJECTION, SOLUTION INTRAVENOUS at 21:04

## 2021-10-28 RX ADMIN — ENOXAPARIN SODIUM SCH MG: 100 INJECTION SUBCUTANEOUS at 08:59

## 2021-10-28 RX ADMIN — HYDRALAZINE HYDROCHLORIDE PRN MG: 20 INJECTION INTRAMUSCULAR; INTRAVENOUS at 17:03

## 2021-10-28 RX ADMIN — ONDANSETRON PRN MG: 2 INJECTION, SOLUTION INTRAMUSCULAR; INTRAVENOUS at 23:02

## 2021-10-28 RX ADMIN — ENOXAPARIN SODIUM SCH MG: 100 INJECTION SUBCUTANEOUS at 21:03

## 2021-10-28 RX ADMIN — CEFEPIME HYDROCHLORIDE SCH MLS/HR: 2 INJECTION, POWDER, FOR SOLUTION INTRAVENOUS at 00:10

## 2021-10-28 RX ADMIN — ONDANSETRON PRN MG: 2 INJECTION, SOLUTION INTRAMUSCULAR; INTRAVENOUS at 17:34

## 2021-10-28 RX ADMIN — INSULIN LISPRO PRN UNIT: 100 INJECTION, SOLUTION INTRAVENOUS; SUBCUTANEOUS at 07:53

## 2021-10-28 RX ADMIN — LABETALOL HYDROCHLORIDE SCH MG: 5 INJECTION, SOLUTION INTRAVENOUS at 12:27

## 2021-10-28 RX ADMIN — PANTOPRAZOLE SODIUM SCH MG: 40 INJECTION, POWDER, FOR SOLUTION INTRAVENOUS at 08:59

## 2021-10-28 RX ADMIN — ONDANSETRON PRN MG: 2 INJECTION, SOLUTION INTRAMUSCULAR; INTRAVENOUS at 02:14

## 2021-10-28 RX ADMIN — CEFEPIME HYDROCHLORIDE SCH MLS/HR: 2 INJECTION, POWDER, FOR SOLUTION INTRAVENOUS at 23:02

## 2021-10-28 RX ADMIN — CEFEPIME HYDROCHLORIDE SCH MLS/HR: 2 INJECTION, POWDER, FOR SOLUTION INTRAVENOUS at 11:16

## 2021-10-28 NOTE — XRAY
Indication: Confusion.



Multiple contiguous axial images obtained through the head without contrast.



Comparison: January 26, 2021.



There remains age-appropriate global atrophy and moderate periventricular

degenerative micro-ischemia bilaterally.  No acute intracranial hemorrhage,

abnormal extra-axial fluid collection, or mass effect.  Fourth ventricle is

midline without hydrocephalus.  Bony calvarium intact.  Visualized paranasal

sinuses and mastoid air cells are clear.



Impression: Continued nonacute senile brain.

## 2021-10-29 LAB
ANION GAP SERPL CALC-SCNC: 15.1 MEQ/L (ref 5–15)
BUN SERPL-MCNC: 15 MG/DL (ref 7–17)
CALCIUM SPEC-MCNC: 8.9 MG/DL (ref 8.4–10.2)
CHLORIDE SERPL-SCNC: 109 MMOL/L (ref 98–107)
CO2 SERPL-SCNC: 26 MMOL/L (ref 22–30)
CREAT SERPL-MCNC: 0.54 MG/DL (ref 0.52–1.04)
GFR SERPLBLD BASED ON 1.73 SQ M-ARVRAT: > 60 ML/MIN
GLUCOSE SERPL-MCNC: 314 MG/DL (ref 74–106)
HCT VFR BLD AUTO: 35.9 % (ref 35–47)
HGB BLD-MCNC: 11 GM/DL (ref 12–16)
MCH RBC QN AUTO: 29.5 PG (ref 26–32)
MCHC RBC AUTO-ENTMCNC: 30.6 G/DL (ref 32–36)
PLATELET # BLD AUTO: 151 K/MM3 (ref 150–450)
POTASSIUM SERPLBLD-SCNC: 3.9 MMOL/L (ref 3.5–5.1)
RBC # BLD AUTO: 3.73 M/MM3 (ref 4.1–5.4)
SODIUM SERPL-SCNC: 146 MMOL/L (ref 137–145)
WBC # BLD AUTO: 11.7 K/MM3 (ref 4–10.5)

## 2021-10-29 RX ADMIN — PANTOPRAZOLE SODIUM SCH MG: 40 INJECTION, POWDER, FOR SOLUTION INTRAVENOUS at 09:48

## 2021-10-29 RX ADMIN — INSULIN LISPRO PRN UNIT: 100 INJECTION, SOLUTION INTRAVENOUS; SUBCUTANEOUS at 04:59

## 2021-10-29 RX ADMIN — CEFEPIME HYDROCHLORIDE SCH MLS/HR: 2 INJECTION, POWDER, FOR SOLUTION INTRAVENOUS at 05:00

## 2021-10-29 RX ADMIN — LABETALOL HYDROCHLORIDE SCH MG: 5 INJECTION, SOLUTION INTRAVENOUS at 14:00

## 2021-10-29 RX ADMIN — HYDRALAZINE HYDROCHLORIDE PRN MG: 20 INJECTION INTRAMUSCULAR; INTRAVENOUS at 16:26

## 2021-10-29 RX ADMIN — INSULIN LISPRO PRN UNIT: 100 INJECTION, SOLUTION INTRAVENOUS; SUBCUTANEOUS at 20:01

## 2021-10-29 RX ADMIN — HYDRALAZINE HYDROCHLORIDE SCH: 25 TABLET ORAL at 22:26

## 2021-10-29 RX ADMIN — HYDRALAZINE HYDROCHLORIDE SCH MG: 25 TABLET ORAL at 17:38

## 2021-10-29 RX ADMIN — HYDRALAZINE HYDROCHLORIDE PRN MG: 20 INJECTION INTRAMUSCULAR; INTRAVENOUS at 08:03

## 2021-10-29 RX ADMIN — INSULIN LISPRO PRN UNIT: 100 INJECTION, SOLUTION INTRAVENOUS; SUBCUTANEOUS at 16:25

## 2021-10-29 RX ADMIN — HYDROCHLOROTHIAZIDE SCH MG: 25 TABLET ORAL at 17:39

## 2021-10-29 RX ADMIN — CEFEPIME HYDROCHLORIDE SCH MLS/HR: 2 INJECTION, POWDER, FOR SOLUTION INTRAVENOUS at 17:39

## 2021-10-29 RX ADMIN — CLONIDINE HYDROCHLORIDE SCH MG: 0.1 TABLET ORAL at 17:39

## 2021-10-29 RX ADMIN — LABETALOL HYDROCHLORIDE SCH MG: 5 INJECTION, SOLUTION INTRAVENOUS at 05:00

## 2021-10-29 RX ADMIN — ENOXAPARIN SODIUM SCH MG: 100 INJECTION SUBCUTANEOUS at 22:26

## 2021-10-29 RX ADMIN — CEFEPIME HYDROCHLORIDE SCH MLS/HR: 2 INJECTION, POWDER, FOR SOLUTION INTRAVENOUS at 11:46

## 2021-10-29 RX ADMIN — ENOXAPARIN SODIUM SCH MG: 100 INJECTION SUBCUTANEOUS at 09:48

## 2021-10-29 RX ADMIN — METOPROLOL SUCCINATE SCH MG: 50 TABLET, EXTENDED RELEASE ORAL at 17:39

## 2021-10-29 RX ADMIN — INSULIN LISPRO PRN UNIT: 100 INJECTION, SOLUTION INTRAVENOUS; SUBCUTANEOUS at 08:00

## 2021-10-29 RX ADMIN — HYDRALAZINE HYDROCHLORIDE PRN MG: 20 INJECTION INTRAMUSCULAR; INTRAVENOUS at 00:10

## 2021-10-29 NOTE — XRAY
Indication: Cough.



Comparison: October 27, 2021.



Portable chest remains hyperinflated and clear.  Heart not enlarged again with

mitral valve calcifications.  Bony thorax intact again with osteopenia and

degenerative changes.  No new/acute findings.

## 2021-10-30 LAB
ALBUMIN SERPL-MCNC: 3.3 G/DL (ref 3.5–5)
ALP SERPL-CCNC: 107 U/L (ref 38–126)
ALT SERPL-CCNC: 27 U/L (ref 0–35)
ANION GAP SERPL CALC-SCNC: 10.6 MEQ/L (ref 5–15)
ANION GAP SERPL CALC-SCNC: 15.9 MEQ/L (ref 5–15)
AST SERPL QL: 23 U/L (ref 14–36)
BILIRUB BLD-MCNC: 0.7 MG/DL (ref 0.2–1.3)
BUN SERPL-MCNC: 21 MG/DL (ref 7–17)
BUN SERPL-MCNC: 21 MG/DL (ref 7–17)
CALCIUM SPEC-MCNC: 8.6 MG/DL (ref 8.4–10.2)
CALCIUM SPEC-MCNC: 8.7 MG/DL (ref 8.4–10.2)
CHLORIDE SERPL-SCNC: 107 MMOL/L (ref 98–107)
CHLORIDE SERPL-SCNC: 112 MMOL/L (ref 98–107)
CO2 SERPL-SCNC: 25 MMOL/L (ref 22–30)
CO2 SERPL-SCNC: 28 MMOL/L (ref 22–30)
CREAT SERPL-MCNC: 0.77 MG/DL (ref 0.52–1.04)
CREAT SERPL-MCNC: 0.78 MG/DL (ref 0.52–1.04)
GFR SERPLBLD BASED ON 1.73 SQ M-ARVRAT: > 60 ML/MIN
GFR SERPLBLD BASED ON 1.73 SQ M-ARVRAT: > 60 ML/MIN
GLUCOSE SERPL-MCNC: 251 MG/DL (ref 74–106)
GLUCOSE SERPL-MCNC: 320 MG/DL (ref 74–106)
HCT VFR BLD AUTO: 37.7 % (ref 35–47)
HGB BLD-MCNC: 11.3 GM/DL (ref 12–16)
MCH RBC QN AUTO: 29.2 PG (ref 26–32)
MCHC RBC AUTO-ENTMCNC: 30 G/DL (ref 32–36)
PLATELET # BLD AUTO: 157 K/MM3 (ref 150–450)
POTASSIUM SERPLBLD-SCNC: 3 MMOL/L (ref 3.5–5.1)
POTASSIUM SERPLBLD-SCNC: 3.4 MMOL/L (ref 3.5–5.1)
POTASSIUM UR-SCNC: 50.7 MMOL/L
PROT SERPL-MCNC: 6.1 G/DL (ref 6.3–8.2)
RBC # BLD AUTO: 3.87 M/MM3 (ref 4.1–5.4)
SODIUM SERPL-SCNC: 143 MMOL/L (ref 137–145)
SODIUM SERPL-SCNC: 150 MMOL/L (ref 137–145)
SODIUM UR-SCNC: 139 MMOL/L (ref 30–90)
WBC # BLD AUTO: 8.8 K/MM3 (ref 4–10.5)

## 2021-10-30 RX ADMIN — HYDRALAZINE HYDROCHLORIDE PRN MG: 20 INJECTION INTRAMUSCULAR; INTRAVENOUS at 03:13

## 2021-10-30 RX ADMIN — INSULIN LISPRO PRN UNIT: 100 INJECTION, SOLUTION INTRAVENOUS; SUBCUTANEOUS at 11:44

## 2021-10-30 RX ADMIN — ACETAMINOPHEN PRN MG: 325 TABLET ORAL at 15:28

## 2021-10-30 RX ADMIN — INSULIN LISPRO PRN UNIT: 100 INJECTION, SOLUTION INTRAVENOUS; SUBCUTANEOUS at 15:41

## 2021-10-30 RX ADMIN — ENOXAPARIN SODIUM SCH MG: 100 INJECTION SUBCUTANEOUS at 08:37

## 2021-10-30 RX ADMIN — CLONIDINE HYDROCHLORIDE SCH MG: 0.1 TABLET ORAL at 08:37

## 2021-10-30 RX ADMIN — HYDRALAZINE HYDROCHLORIDE SCH MG: 25 TABLET ORAL at 15:26

## 2021-10-30 RX ADMIN — ENOXAPARIN SODIUM SCH MG: 100 INJECTION SUBCUTANEOUS at 20:12

## 2021-10-30 RX ADMIN — INSULIN LISPRO PRN UNIT: 100 INJECTION, SOLUTION INTRAVENOUS; SUBCUTANEOUS at 03:57

## 2021-10-30 RX ADMIN — CEFEPIME HYDROCHLORIDE SCH MLS/HR: 2 INJECTION, POWDER, FOR SOLUTION INTRAVENOUS at 11:38

## 2021-10-30 RX ADMIN — CLONIDINE HYDROCHLORIDE SCH MG: 0.1 TABLET ORAL at 20:11

## 2021-10-30 RX ADMIN — PANTOPRAZOLE SODIUM SCH MG: 40 INJECTION, POWDER, FOR SOLUTION INTRAVENOUS at 08:37

## 2021-10-30 RX ADMIN — CEFEPIME HYDROCHLORIDE SCH MLS/HR: 2 INJECTION, POWDER, FOR SOLUTION INTRAVENOUS at 23:59

## 2021-10-30 RX ADMIN — ONDANSETRON PRN MG: 2 INJECTION, SOLUTION INTRAMUSCULAR; INTRAVENOUS at 07:51

## 2021-10-30 RX ADMIN — CEFEPIME HYDROCHLORIDE SCH MLS/HR: 2 INJECTION, POWDER, FOR SOLUTION INTRAVENOUS at 17:16

## 2021-10-30 RX ADMIN — POTASSIUM CHLORIDE SCH MEQ: 10 TABLET, EXTENDED RELEASE ORAL at 20:11

## 2021-10-30 RX ADMIN — CEFEPIME HYDROCHLORIDE SCH MLS/HR: 2 INJECTION, POWDER, FOR SOLUTION INTRAVENOUS at 00:38

## 2021-10-30 RX ADMIN — HYDRALAZINE HYDROCHLORIDE SCH MG: 25 TABLET ORAL at 08:37

## 2021-10-30 RX ADMIN — HYDRALAZINE HYDROCHLORIDE SCH MG: 25 TABLET ORAL at 20:11

## 2021-10-30 RX ADMIN — CEFEPIME HYDROCHLORIDE SCH MLS/HR: 2 INJECTION, POWDER, FOR SOLUTION INTRAVENOUS at 05:48

## 2021-10-30 RX ADMIN — HYDROCHLOROTHIAZIDE SCH MG: 25 TABLET ORAL at 08:37

## 2021-10-30 RX ADMIN — METOPROLOL SUCCINATE SCH MG: 50 TABLET, EXTENDED RELEASE ORAL at 08:37

## 2021-10-30 RX ADMIN — ONDANSETRON PRN MG: 2 INJECTION, SOLUTION INTRAMUSCULAR; INTRAVENOUS at 20:43

## 2021-10-30 RX ADMIN — METOPROLOL SUCCINATE SCH MG: 50 TABLET, EXTENDED RELEASE ORAL at 20:11

## 2021-10-30 NOTE — PCM.NOTE
Date and Time: 10/30/21  1407





Subjective Assessment: 





Pt was apparently aggressive last night - spitting, biting, and clawing at 

staff.  However, today she is pleasant and cooperative.  Just wants orange 

juice.





- Review of Systems


Constitutional: No Fever


Abdominal/Gastrointestinal: No Vomiting, No Diarrhea





Objective Exam


General Appearance: no apparent distress, alert


Neurologic Exam: disoriented (not oriented to place)


Skin Exam: normal color, warm, dry, No rash


Eye Exam: eyes nml inspection


Ears, Nose, Throat Exam: moist mucous membranes


Neck Exam: normal inspection


Respiratory Exam: normal breath sounds, lungs clear, No crackles/rales, No 

rhonchi, No wheezing


Cardiovascular Exam: regular rate/rhythm, normal heart sounds, No murmur


Gastrointestinal/Abdomen Exam: soft, normal bowel sounds, No tenderness, No 

distention, No mass, No guarding, No rebound


Extremity Exam: normal inspection, No pedal edema, No swelling


Back Exam: normal inspection, No rash





OBJECTIVE DATA


Vital Signs: 


                               Vital Signs - 24 hr











  Temp Pulse Resp BP Pulse Ox


 


 10/30/21 11:45   80   


 


 10/30/21 11:00  99.8 F  80  18  160/74  97


 


 10/30/21 07:21   81   


 


 10/30/21 07:00  99.3 F  88  20  182/82  97


 


 10/30/21 04:00   86   


 


 10/30/21 03:00  99.5 F  84  24  188/85 


 


 10/30/21 00:01   82   


 


 10/29/21 23:00  99.7 F  79  13  134/72 


 


 10/29/21 20:00   85   


 


 10/29/21 19:49  99.5 F  85  14  175/84  91 L


 


 10/29/21 19:00      94 L


 


 10/29/21 16:57  99.5 F  85  17  189/156  97


 


 10/29/21 16:00   101 H   








                        Pain Assessment - Last Documented











Pain Intensity                 0











Intake and Output: 


                                 Intake & Output











 10/28/21 10/29/21 10/30/21 10/31/21





 11:59 11:59 11:59 11:59


 


Intake Total 2476 2196 2153 


 


Output Total 995 1750 1000 


 


Balance 8588 321 2896 


 


Weight 46.1 kg 46.4 kg 48.1 kg 











Lab Results: 


                            Lab Results-Last 24 Hours











  10/29/21 10/29/21 10/29/21 Range/Units





  16:12 19:55 23:40 


 


WBC     (4.0-10.5)  K/mm3


 


RBC     (4.1-5.4)  M/mm3


 


Hgb     (12.0-16.0)  gm/dl


 


Hct     (35-47)  %


 


MCV     ()  fl


 


MCH     (26-32)  pg


 


MCHC     (32-36)  g/dl


 


RDW     (11.5-14.0)  %


 


Plt Count     (150-450)  K/mm3


 


MPV     (7.5-11.0)  fl


 


Sodium     (137-145)  mmol/L


 


Potassium     (3.5-5.1)  mmol/L


 


Chloride     ()  mmol/L


 


Carbon Dioxide     (22-30)  mmol/L


 


Anion Gap     (5-15)  MEQ/L


 


BUN     (7-17)  mg/dL


 


Creatinine     (0.52-1.04)  mg/dL


 


Estimated GFR     ML/MIN


 


Glucose     ()  mg/dL


 


POC Glucometer  295 H  306 H  197 H  (74 to 106)  mg/dL


 


Calcium     (8.4-10.2)  mg/dL


 


Total Bilirubin     (0.2-1.3)  mg/dL


 


AST     (14-36)  U/L


 


ALT     (0-35)  U/L


 


Alkaline Phosphatase     ()  U/L


 


Serum Total Protein     (6.3-8.2)  g/dL


 


Albumin     (3.5-5.0)  g/dL


 


Urine Sodium     (30-90)  mmol/L


 


Urine Potassium     mmol/L














  10/30/21 10/30/21 10/30/21 Range/Units





  03:51 05:15 05:15 


 


WBC   8.8   (4.0-10.5)  K/mm3


 


RBC   3.87 L   (4.1-5.4)  M/mm3


 


Hgb   11.3 L   (12.0-16.0)  gm/dl


 


Hct   37.7   (35-47)  %


 


MCV   97.4   ()  fl


 


MCH   29.2   (26-32)  pg


 


MCHC   30.0 L   (32-36)  g/dl


 


RDW   14.5 H   (11.5-14.0)  %


 


Plt Count   157   (150-450)  K/mm3


 


MPV   13.1 H   (7.5-11.0)  fl


 


Sodium    150 H*  (137-145)  mmol/L


 


Potassium    3.0 L* D  (3.5-5.1)  mmol/L


 


Chloride    112 H  ()  mmol/L


 


Carbon Dioxide    25  (22-30)  mmol/L


 


Anion Gap    15.9 H  (5-15)  MEQ/L


 


BUN    21 H  (7-17)  mg/dL


 


Creatinine    0.77  (0.52-1.04)  mg/dL


 


Estimated GFR    > 60.0  ML/MIN


 


Glucose    320 H  ()  mg/dL


 


POC Glucometer  374 H    (74 to 106)  mg/dL


 


Calcium    8.7  (8.4-10.2)  mg/dL


 


Total Bilirubin    0.70  (0.2-1.3)  mg/dL


 


AST    23  (14-36)  U/L


 


ALT    27  (0-35)  U/L


 


Alkaline Phosphatase    107  ()  U/L


 


Serum Total Protein    6.1 L  (6.3-8.2)  g/dL


 


Albumin    3.3 L  (3.5-5.0)  g/dL


 


Urine Sodium     (30-90)  mmol/L


 


Urine Potassium     mmol/L














  10/30/21 10/30/21 10/30/21 Range/Units





  07:28 11:42 12:15 


 


WBC     (4.0-10.5)  K/mm3


 


RBC     (4.1-5.4)  M/mm3


 


Hgb     (12.0-16.0)  gm/dl


 


Hct     (35-47)  %


 


MCV     ()  fl


 


MCH     (26-32)  pg


 


MCHC     (32-36)  g/dl


 


RDW     (11.5-14.0)  %


 


Plt Count     (150-450)  K/mm3


 


MPV     (7.5-11.0)  fl


 


Sodium     (137-145)  mmol/L


 


Potassium     (3.5-5.1)  mmol/L


 


Chloride     ()  mmol/L


 


Carbon Dioxide     (22-30)  mmol/L


 


Anion Gap     (5-15)  MEQ/L


 


BUN     (7-17)  mg/dL


 


Creatinine     (0.52-1.04)  mg/dL


 


Estimated GFR     ML/MIN


 


Glucose     ()  mg/dL


 


POC Glucometer  187 H  318 H   (74 to 106)  mg/dL


 


Calcium     (8.4-10.2)  mg/dL


 


Total Bilirubin     (0.2-1.3)  mg/dL


 


AST     (14-36)  U/L


 


ALT     (0-35)  U/L


 


Alkaline Phosphatase     ()  U/L


 


Serum Total Protein     (6.3-8.2)  g/dL


 


Albumin     (3.5-5.0)  g/dL


 


Urine Sodium    139 H  (30-90)  mmol/L


 


Urine Potassium    50.7  mmol/L











Radiology Exams: 


                              Radiology Procedures











 Category Date Time Status


 


 CHEST 1 VIEW (PORTABLE) Routine Exams  10/29/21 08:00 Completed














Assessment/Plan


(1) Hypernatremia


Current Visit: Yes   Status: Acute   


Assessment & Plan: 


Likely due to her previous HHS and subsequent correction.  She has no ongoing 

fluid loss through vomiting or diarrhea; has no NG tube.  She is not taking in 

much po because she does not like the honey thickened liquids.  I changed her 

fluids to 1/2 NS this morning and will increase rate to 150mL/hr.  Recheck BMP 

this afternoon.  Urine and serum osmolarity are pending.


Code(s): E87.0 - HYPEROSMOLALITY AND HYPERNATREMIA   





(2) Hyperosmolar hyperglycemic state (HHS)


Current Visit: Yes   Status: Resolved   


Assessment & Plan: 


BS was 713 on admission.  Resolved, but she is not on any long acting insulin; 

consider starting.


Code(s): E11.00 - TYPE 2 DIAB W HYPROSM W/O NONKET HYPRGLY-HYPROS COMA ([facility]); 

E11.65 - TYPE 2 DIABETES MELLITUS WITH HYPERGLYCEMIA   





(3) Sepsis


Current Visit: Yes   Status: Acute   


Qualifiers: 


   Sepsis type: sepsis due to unspecified organism   Sepsis acute organ 

dysfunction status: unspecified   Qualified Code(s): A41.9 - Sepsis, unspecified

organism   


Assessment & Plan: 


Her WBC is now normal; still on zosyn.  checking calcitonin.








(4) Hypertension


Current Visit: Yes   Status: Acute   


Qualifiers: 


   Hypertension type: primary hypertension   Qualified Code(s): I10 - Essential 

(primary) hypertension   


Assessment & Plan: 


Several BP > 180 last night; has prn hydralazine ordered.


Code(s): I10 - ESSENTIAL (PRIMARY) HYPERTENSION   





(5) Behavior concern in adult


Current Visit: Yes   Status: Resolved   


Assessment & Plan: 


behavior is currently cooperative.


Code(s): F69 - UNSPECIFIED DISORDER OF ADULT PERSONALITY AND BEHAVIOR

## 2021-10-31 LAB
ANION GAP SERPL CALC-SCNC: 10.8 MEQ/L (ref 5–15)
BUN SERPL-MCNC: 18 MG/DL (ref 7–17)
CALCIUM SPEC-MCNC: 8.1 MG/DL (ref 8.4–10.2)
CELLS COUNTED: 100
CHLORIDE SERPL-SCNC: 105 MMOL/L (ref 98–107)
CO2 SERPL-SCNC: 27 MMOL/L (ref 22–30)
CREAT SERPL-MCNC: 0.64 MG/DL (ref 0.52–1.04)
GFR SERPLBLD BASED ON 1.73 SQ M-ARVRAT: > 60 ML/MIN
GLUCOSE SERPL-MCNC: 197 MG/DL (ref 74–106)
HCT VFR BLD AUTO: 35.4 % (ref 35–47)
HGB BLD-MCNC: 10.9 GM/DL (ref 12–16)
MANUAL DIF COMMENT BLD-IMP: NORMAL
MCH RBC QN AUTO: 29.5 PG (ref 26–32)
MCHC RBC AUTO-ENTMCNC: 30.8 G/DL (ref 32–36)
PLATELET # BLD AUTO: 152 K/MM3 (ref 150–450)
POTASSIUM SERPLBLD-SCNC: 3.3 MMOL/L (ref 3.5–5.1)
RBC # BLD AUTO: 3.7 M/MM3 (ref 4.1–5.4)
SODIUM SERPL-SCNC: 140 MMOL/L (ref 137–145)
WBC # BLD AUTO: 6.4 K/MM3 (ref 4–10.5)

## 2021-10-31 RX ADMIN — ONDANSETRON PRN MG: 2 INJECTION, SOLUTION INTRAMUSCULAR; INTRAVENOUS at 23:19

## 2021-10-31 RX ADMIN — INSULIN LISPRO PRN UNIT: 100 INJECTION, SOLUTION INTRAVENOUS; SUBCUTANEOUS at 16:34

## 2021-10-31 RX ADMIN — HYDRALAZINE HYDROCHLORIDE SCH MG: 25 TABLET ORAL at 14:54

## 2021-10-31 RX ADMIN — POTASSIUM CHLORIDE SCH MEQ: 10 TABLET, EXTENDED RELEASE ORAL at 09:17

## 2021-10-31 RX ADMIN — ENOXAPARIN SODIUM SCH MG: 100 INJECTION SUBCUTANEOUS at 20:35

## 2021-10-31 RX ADMIN — HYDRALAZINE HYDROCHLORIDE SCH MG: 25 TABLET ORAL at 09:17

## 2021-10-31 RX ADMIN — INSULIN LISPRO PRN UNIT: 100 INJECTION, SOLUTION INTRAVENOUS; SUBCUTANEOUS at 11:23

## 2021-10-31 RX ADMIN — HYDRALAZINE HYDROCHLORIDE SCH MG: 25 TABLET ORAL at 20:35

## 2021-10-31 RX ADMIN — CEFEPIME HYDROCHLORIDE SCH MLS/HR: 2 INJECTION, POWDER, FOR SOLUTION INTRAVENOUS at 17:02

## 2021-10-31 RX ADMIN — METOPROLOL SUCCINATE SCH MG: 50 TABLET, EXTENDED RELEASE ORAL at 20:34

## 2021-10-31 RX ADMIN — PANTOPRAZOLE SODIUM SCH MG: 40 INJECTION, POWDER, FOR SOLUTION INTRAVENOUS at 09:17

## 2021-10-31 RX ADMIN — CEFEPIME HYDROCHLORIDE SCH MLS/HR: 2 INJECTION, POWDER, FOR SOLUTION INTRAVENOUS at 11:09

## 2021-10-31 RX ADMIN — POTASSIUM CHLORIDE SCH MEQ: 10 TABLET, EXTENDED RELEASE ORAL at 20:34

## 2021-10-31 RX ADMIN — HYDROCHLOROTHIAZIDE SCH MG: 25 TABLET ORAL at 09:17

## 2021-10-31 RX ADMIN — ENOXAPARIN SODIUM SCH MG: 100 INJECTION SUBCUTANEOUS at 09:23

## 2021-10-31 RX ADMIN — CLONIDINE HYDROCHLORIDE SCH MG: 0.1 TABLET ORAL at 09:18

## 2021-10-31 RX ADMIN — CEFEPIME HYDROCHLORIDE SCH MLS/HR: 2 INJECTION, POWDER, FOR SOLUTION INTRAVENOUS at 06:17

## 2021-10-31 RX ADMIN — INSULIN LISPRO PRN UNIT: 100 INJECTION, SOLUTION INTRAVENOUS; SUBCUTANEOUS at 04:26

## 2021-10-31 RX ADMIN — CLONIDINE HYDROCHLORIDE SCH MG: 0.1 TABLET ORAL at 20:36

## 2021-10-31 RX ADMIN — ACETAMINOPHEN PRN MG: 325 TABLET ORAL at 09:17

## 2021-10-31 RX ADMIN — METOPROLOL SUCCINATE SCH MG: 50 TABLET, EXTENDED RELEASE ORAL at 09:17

## 2021-10-31 RX ADMIN — HYDRALAZINE HYDROCHLORIDE PRN MG: 20 INJECTION INTRAMUSCULAR; INTRAVENOUS at 03:30

## 2021-10-31 NOTE — PCM.NOTE
Date and Time: 10/31/21  1434





Subjective Assessment: 





Pt is oriented to place but not time today.  Says she is lonely.  Has been 

tolerating some po, but a small amount.





- Review of Systems


Constitutional: No Fever


Abdominal/Gastrointestinal: No Vomiting





Objective Exam


General Appearance: no apparent distress, alert


Neurologic Exam: cooperative, normal mood/affect, disoriented


Skin Exam: normal color, warm, dry, No rash


Eye Exam: eyes nml inspection


Ears, Nose, Throat Exam: moist mucous membranes


Neck Exam: normal inspection


Respiratory Exam: normal breath sounds, lungs clear, No prolonged expirations, 

No crackles/rales, No rhonchi


Cardiovascular Exam: regular rate/rhythm, normal heart sounds, No murmur


Gastrointestinal/Abdomen Exam: soft, normal bowel sounds, No tenderness, No 

distention, No mass, No guarding, No rebound


Extremity Exam: No pedal edema, No swelling


Back Exam: normal inspection, No rash





OBJECTIVE DATA


Vital Signs: 


                               Vital Signs - 24 hr











  Temp Pulse Resp BP Pulse Ox


 


 10/31/21 11:09  98.8 F  80  22  121/70  93 L


 


 10/31/21 07:10  98.1 F  72  19  139/66  96


 


 10/31/21 04:43     112/59 


 


 10/31/21 04:00  98.1 F  77  12  181/93  96


 


 10/31/21 00:00  98.4 F  81   143/79 


 


 10/30/21 19:40  98.6 F  75  18  136/77  93 L


 


 10/30/21 16:00  98.4 F  81  18  157/84 


 


 10/30/21 15:00  99.1 F  78  18  170/74  96








                        Pain Assessment - Last Documented











Pain Intensity                 0


 


Pain Scale Used                0-10 Pain Scale











Intake and Output: 


                                 Intake & Output











 10/29/21 10/30/21 10/31/21 11/01/21





 11:59 11:59 11:59 11:59


 


Intake Total 2196 2153 2682 240


 


Output Total 1750 1000 1050 


 


Balance 446 1153 1632 240


 


Weight 46.4 kg 48.1 kg 48 kg 











Lab Results: 


                            Lab Results-Last 24 Hours











  10/30/21 10/30/21 10/30/21 Range/Units





  05:30 15:39 15:55 


 


WBC     (4.0-10.5)  K/mm3


 


RBC     (4.1-5.4)  M/mm3


 


Hgb     (12.0-16.0)  gm/dl


 


Hct     (35-47)  %


 


MCV     ()  fl


 


MCH     (26-32)  pg


 


MCHC     (32-36)  g/dl


 


RDW     (11.5-14.0)  %


 


Plt Count     (150-450)  K/mm3


 


MPV     (7.5-11.0)  fl


 


Segmented Neutrophils     (36.0-66.0)  %


 


Lymphocytes (Manual)     (24-44)  %


 


Monocytes (Manual)     (0.0-12.0)  %


 


Eosinophils (Manual)     (0.00-3.0)  %


 


Platelet Estimate     (NORMAL)  


 


RBC Morphology     


 


Sodium    143  (137-145)  mmol/L


 


Potassium    3.4 L  (3.5-5.1)  mmol/L


 


Chloride    107  ()  mmol/L


 


Carbon Dioxide    28  (22-30)  mmol/L


 


Anion Gap    10.6  (5-15)  MEQ/L


 


BUN    21 H  (7-17)  mg/dL


 


Creatinine    0.78  (0.52-1.04)  mg/dL


 


Estimated GFR    > 60.0  ML/MIN


 


Glucose    251 H  ()  mg/dL


 


POC Glucometer   239 H   (74 to 106)  mg/dL


 


Hemoglobin A1c  10.15 H    (4.5-6.0)  %


 


Calcium    8.6  (8.4-10.2)  mg/dL














  10/30/21 10/30/21 10/30/21 Range/Units





  19:46 20:45 23:58 


 


WBC     (4.0-10.5)  K/mm3


 


RBC     (4.1-5.4)  M/mm3


 


Hgb     (12.0-16.0)  gm/dl


 


Hct     (35-47)  %


 


MCV     ()  fl


 


MCH     (26-32)  pg


 


MCHC     (32-36)  g/dl


 


RDW     (11.5-14.0)  %


 


Plt Count     (150-450)  K/mm3


 


MPV     (7.5-11.0)  fl


 


Segmented Neutrophils     (36.0-66.0)  %


 


Lymphocytes (Manual)     (24-44)  %


 


Monocytes (Manual)     (0.0-12.0)  %


 


Eosinophils (Manual)     (0.00-3.0)  %


 


Platelet Estimate     (NORMAL)  


 


RBC Morphology     


 


Sodium     (137-145)  mmol/L


 


Potassium     (3.5-5.1)  mmol/L


 


Chloride     ()  mmol/L


 


Carbon Dioxide     (22-30)  mmol/L


 


Anion Gap     (5-15)  MEQ/L


 


BUN     (7-17)  mg/dL


 


Creatinine     (0.52-1.04)  mg/dL


 


Estimated GFR     ML/MIN


 


Glucose     ()  mg/dL


 


POC Glucometer  70 L  180 H  295 H  (74 to 106)  mg/dL


 


Hemoglobin A1c     (4.5-6.0)  %


 


Calcium     (8.4-10.2)  mg/dL














  10/31/21 10/31/21 10/31/21 Range/Units





  04:13 05:20 07:27 


 


WBC     (4.0-10.5)  K/mm3


 


RBC     (4.1-5.4)  M/mm3


 


Hgb     (12.0-16.0)  gm/dl


 


Hct     (35-47)  %


 


MCV     ()  fl


 


MCH     (26-32)  pg


 


MCHC     (32-36)  g/dl


 


RDW     (11.5-14.0)  %


 


Plt Count     (150-450)  K/mm3


 


MPV     (7.5-11.0)  fl


 


Segmented Neutrophils     (36.0-66.0)  %


 


Lymphocytes (Manual)     (24-44)  %


 


Monocytes (Manual)     (0.0-12.0)  %


 


Eosinophils (Manual)     (0.00-3.0)  %


 


Platelet Estimate     (NORMAL)  


 


RBC Morphology     


 


Sodium     (137-145)  mmol/L


 


Potassium     (3.5-5.1)  mmol/L


 


Chloride     ()  mmol/L


 


Carbon Dioxide     (22-30)  mmol/L


 


Anion Gap     (5-15)  MEQ/L


 


BUN     (7-17)  mg/dL


 


Creatinine     (0.52-1.04)  mg/dL


 


Estimated GFR     ML/MIN


 


Glucose     ()  mg/dL


 


POC Glucometer  365 H  318 H  209 H  (74 to 106)  mg/dL


 


Hemoglobin A1c     (4.5-6.0)  %


 


Calcium     (8.4-10.2)  mg/dL














  10/31/21 10/31/21 10/31/21 Range/Units





  08:28 08:28 11:16 


 


WBC  6.4    (4.0-10.5)  K/mm3


 


RBC  3.70 L    (4.1-5.4)  M/mm3


 


Hgb  10.9 L    (12.0-16.0)  gm/dl


 


Hct  35.4    (35-47)  %


 


MCV  95.7    ()  fl


 


MCH  29.5    (26-32)  pg


 


MCHC  30.8 L    (32-36)  g/dl


 


RDW  14.3 H    (11.5-14.0)  %


 


Plt Count  152    (150-450)  K/mm3


 


MPV  12.3 H    (7.5-11.0)  fl


 


Segmented Neutrophils  68 H    (36.0-66.0)  %


 


Lymphocytes (Manual)  19 L    (24-44)  %


 


Monocytes (Manual)  12    (0.0-12.0)  %


 


Eosinophils (Manual)  1    (0.00-3.0)  %


 


Platelet Estimate  NORMAL    (NORMAL)  


 


RBC Morphology  NORMAL    


 


Sodium   140   (137-145)  mmol/L


 


Potassium   3.3 L   (3.5-5.1)  mmol/L


 


Chloride   105   ()  mmol/L


 


Carbon Dioxide   27   (22-30)  mmol/L


 


Anion Gap   10.8   (5-15)  MEQ/L


 


BUN   18 H   (7-17)  mg/dL


 


Creatinine   0.64   (0.52-1.04)  mg/dL


 


Estimated GFR   > 60.0   ML/MIN


 


Glucose   197 H   ()  mg/dL


 


POC Glucometer    269 H  (74 to 106)  mg/dL


 


Hemoglobin A1c     (4.5-6.0)  %


 


Calcium   8.1 L   (8.4-10.2)  mg/dL














Assessment/Plan


(1) Diabetes mellitus


Current Visit: Yes   Status: Acute   


Qualifiers: 


   Diabetes mellitus type: type 2   Diabetes mellitus long term insulin use: 

without long term use   Diabetes mellitus complication status: with 

hyperglycemia   Qualified Code(s): E11.65 - Type 2 diabetes mellitus with 

hyperglycemia   


Assessment & Plan: 


Her sliding scale insulin usage over the past 24 hours was 26 units, so I will 

start lantus this evening at 13 units daily. 


A1c was 10.15.


Code(s): E11.9 - TYPE 2 DIABETES MELLITUS WITHOUT COMPLICATIONS   





(2) Hypernatremia


Current Visit: Yes   Status: Resolved   Code(s): E87.0 - HYPEROSMOLALITY AND 

HYPERNATREMIA   





(3) Hyperosmolar hyperglycemic state (HHS)


Current Visit: Yes   Status: Resolved   Code(s): E11.00 - TYPE 2 DIAB W HYPROSM 

W/O NONKET HYPRGLY-HYPROS COMA ([facility]); E11.65 - TYPE 2 DIABETES MELLITUS WITH 

HYPERGLYCEMIA   





(4) Sepsis


Current Visit: Yes   Status: Acute   


Qualifiers: 


   Sepsis type: sepsis due to unspecified organism   Sepsis acute organ 

dysfunction status: unspecified   Qualified Code(s): A41.9 - Sepsis, unspecified

organism   


Assessment & Plan: 


On zosyn day #5.








(5) Hypertension


Current Visit: Yes   Status: Chronic   


Qualifiers: 


   Hypertension type: primary hypertension   Qualified Code(s): I10 - Essential 

(primary) hypertension   


Code(s): I10 - ESSENTIAL (PRIMARY) HYPERTENSION   





(6) Behavior concern in adult


Current Visit: Yes   Status: Resolved   Code(s): F69 - UNSPECIFIED DISORDER OF 

ADULT PERSONALITY AND BEHAVIOR   





(7) Hypokalemia


Current Visit: Yes   Status: Acute   


Assessment & Plan: 


will increase potassium to 20mEq po BID (from 10 mEq po BID).


Code(s): E87.6 - HYPOKALEMIA

## 2021-11-01 ENCOUNTER — HOSPITAL ENCOUNTER (EMERGENCY)
Dept: HOSPITAL 33 - ED | Age: 76
LOS: 1 days | Discharge: NURSING, REHAB OR LONG-TERM CARE | End: 2021-11-02
Payer: MEDICARE

## 2021-11-01 VITALS — DIASTOLIC BLOOD PRESSURE: 92 MMHG | SYSTOLIC BLOOD PRESSURE: 136 MMHG | OXYGEN SATURATION: 96 % | HEART RATE: 79 BPM

## 2021-11-01 DIAGNOSIS — Z79.899: ICD-10-CM
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
## 2021-11-01 DIAGNOSIS — R29.6: ICD-10-CM
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
## 2021-11-01 DIAGNOSIS — S42.302A: Primary | ICD-10-CM
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
## 2021-11-01 LAB
ALBUMIN SERPL-MCNC: 3.4 G/DL (ref 3.5–5)
ALP SERPL-CCNC: 102 U/L (ref 38–126)
ALT SERPL-CCNC: 24 U/L (ref 0–35)
ANION GAP SERPL CALC-SCNC: 11.1 MEQ/L (ref 5–15)
AST SERPL QL: 21 U/L (ref 14–36)
BILIRUB BLD-MCNC: 0.4 MG/DL (ref 0.2–1.3)
BUN SERPL-MCNC: 13 MG/DL (ref 7–17)
CALCIUM SPEC-MCNC: 8.3 MG/DL (ref 8.4–10.2)
CELLS COUNTED: 100
CHLORIDE SERPL-SCNC: 101 MMOL/L (ref 98–107)
CO2 SERPL-SCNC: 28 MMOL/L (ref 22–30)
CREAT SERPL-MCNC: 0.7 MG/DL (ref 0.52–1.04)
GFR SERPLBLD BASED ON 1.73 SQ M-ARVRAT: > 60 ML/MIN
GLUCOSE SERPL-MCNC: 139 MG/DL (ref 74–106)
HCT VFR BLD AUTO: 36.7 % (ref 35–47)
HGB BLD-MCNC: 11.5 GM/DL (ref 12–16)
MANUAL DIF COMMENT BLD-IMP: (no result)
MCH RBC QN AUTO: 29.6 PG (ref 26–32)
MCHC RBC AUTO-ENTMCNC: 31.3 G/DL (ref 32–36)
NEUTS BAND # BLD MANUAL: 3 % (ref 0–2)
PLATELET # BLD AUTO: 163 K/MM3 (ref 150–450)
POTASSIUM SERPLBLD-SCNC: 3.4 MMOL/L (ref 3.5–5.1)
PROT SERPL-MCNC: 6.3 G/DL (ref 6.3–8.2)
RBC # BLD AUTO: 3.88 M/MM3 (ref 4.1–5.4)
SODIUM SERPL-SCNC: 136 MMOL/L (ref 137–145)
WBC # BLD AUTO: 6.9 K/MM3 (ref 4–10.5)

## 2021-11-01 PROCEDURE — 73130 X-RAY EXAM OF HAND: CPT

## 2021-11-01 PROCEDURE — 73060 X-RAY EXAM OF HUMERUS: CPT

## 2021-11-01 PROCEDURE — 70450 CT HEAD/BRAIN W/O DYE: CPT

## 2021-11-01 PROCEDURE — 73110 X-RAY EXAM OF WRIST: CPT

## 2021-11-01 PROCEDURE — 73070 X-RAY EXAM OF ELBOW: CPT

## 2021-11-01 PROCEDURE — 99284 EMERGENCY DEPT VISIT MOD MDM: CPT

## 2021-11-01 PROCEDURE — 71045 X-RAY EXAM CHEST 1 VIEW: CPT

## 2021-11-01 RX ADMIN — CLONIDINE HYDROCHLORIDE SCH MG: 0.1 TABLET ORAL at 08:27

## 2021-11-01 RX ADMIN — METOPROLOL SUCCINATE SCH MG: 50 TABLET, EXTENDED RELEASE ORAL at 08:26

## 2021-11-01 RX ADMIN — HYDRALAZINE HYDROCHLORIDE SCH MG: 25 TABLET ORAL at 08:26

## 2021-11-01 RX ADMIN — CEFEPIME HYDROCHLORIDE SCH MLS/HR: 2 INJECTION, POWDER, FOR SOLUTION INTRAVENOUS at 06:01

## 2021-11-01 RX ADMIN — PANTOPRAZOLE SODIUM SCH MG: 40 INJECTION, POWDER, FOR SOLUTION INTRAVENOUS at 08:28

## 2021-11-01 RX ADMIN — CEFEPIME HYDROCHLORIDE SCH MLS/HR: 2 INJECTION, POWDER, FOR SOLUTION INTRAVENOUS at 00:28

## 2021-11-01 RX ADMIN — ENOXAPARIN SODIUM SCH MG: 100 INJECTION SUBCUTANEOUS at 09:10

## 2021-11-01 RX ADMIN — HYDROCHLOROTHIAZIDE SCH MG: 25 TABLET ORAL at 08:27

## 2021-11-01 RX ADMIN — CEFEPIME HYDROCHLORIDE SCH: 2 INJECTION, POWDER, FOR SOLUTION INTRAVENOUS at 11:30

## 2021-11-01 RX ADMIN — POTASSIUM CHLORIDE SCH MEQ: 10 TABLET, EXTENDED RELEASE ORAL at 08:26

## 2021-11-01 NOTE — ERPHSYRPT
- History of Present Illness


Time Seen by Provider: 11/01/21 22:31


Source: patient


Exam Limitations: no limitations


Patient Subjective Stated Complaint: per ems, pt has fallen twice today since 

being back at the nursing home. pt has now been c/o pain in her lt shoulder.


Triage Nursing Assessment: pt awake and alert. oriented to person only at 

thistime. pt restless in bed. repetitive speech. bruising noted to ilat arms. 

bruising to lt anterior shoulder. swelling to lt shoulder, upper arm and hand. 

radial pulse and cap refill wnl.


Physician History: 





Patient is at baseline per nursing home staff.  2 unwitnessed falls today.  

History of frequent falls.  Just discharged from the hospital today.  Bruising 

to left upper humerus, left hand, abrasions over left arm.  Patient is not on a 

blood thinner.


Timing/Duration: today


Severity: moderate


Modifying Factors: Improves With: immobilization


Associated Symptoms: denies symptoms


Allergies/Adverse Reactions: 








No Known Drug Allergies Allergy (Verified 11/01/21 22:49)


   





Home Medications: 








Acetaminophen 325 mg*** [Tylenol 325 mg***] 650 mg PO Q4HPRN PRN 10/27/21 

[History]


Aspirin EC 81 mg*** [Ecotrin 81 mg***] 81 mg PO DAILY 10/27/21 [History]


Atorvastatin Calcium 40 mg PO HS 10/27/21 [History]


Clonidine HCl 0.2 mg PO BID 10/27/21 [History]


Donepezil HCl 10 mg*** [Aricept 10 MG***] 10 mg PO HS 10/27/21 [History]


Glucagon 1 mg*** [GlucaGen 1 MG***] 1 mg IM UD 10/27/21 [History]


Hydralazine HCl 10 mg PO TID 10/27/21 [History]


Insulin Lispro [Humalog] 8 units SQ 1200,1700 10/27/21 [History]


Insulin Lispro [Humalog] 18 unit SQ 0800 10/27/21 [History]


Lisinopril 40 mg PO DAILY 10/27/21 [History]


Magnesium Hydroxide 30 ml*** [Milk of Magnesia 30 ml***] 30 ml PO DAILY PRN PRN 

10/27/21 [History]


Magnesium Oxide [Mag-Oxide] 200 mg PO DAILY 10/27/21 [History]


Memantine HCl 5 mg PO UD 10/27/21 [History]


Metoprolol Succinate 50 mg*** [Toprol Xl 50 MG***] 50 mg PO BID 10/27/21 

[History]


Multivit,Calc,Mins/Iron/Folic [Therems-M Tablet] 1 tab PO DAILY 10/27/21 

[History]


Nitroglycerin 0.4 mg Tablet*** [Nitrostat 0.4 MG Tablet***] 0.4 mg SL Q5MIN PRN 

MR X 3 PRN 10/27/21 [History]


Ondansetron ODT 4 MG*** [Zofran Odt 4 mg***] 4 mg PO Q6H PRN PRN 10/27/21 

[History]


Sertraline HCl 50 mg** [Zoloft 50 mg Tablet**] 50 mg PO HS 10/27/21 [History]


Vit A/Vit C/Vit E/Zinc/Copper [Preservision Areds Softgel] 1 cap PO DAILY 

10/27/21 [History]





Hx Tetanus, Diphtheria Vaccination/Date Given:  (Unkown)


Hx Influenza Vaccination/Date Given:  (Unknown)


Hx Pneumococcal Vaccination/Date Given:  (Unknown)





Travel Risk





- International Travel


Have you traveled outside of the country in past 3 weeks: No





- Coronavirus Screening


Are you exhibiting any of the following symptoms?: No


Close contact with a COVID-19 positive Pt in past 14-21 Days: No





- Vaccine Status


Have you recieved a Covid-19 vaccination: Yes


: Unknown





- Vaccination Dates


Dates if Unknown: 3/1/2021 and 3/30/2021





- Review of Systems


Constitutional: No Fever, No Chills


Eyes: No Symptoms


Ears, Nose, & Throat: No Symptoms


Respiratory: No Cough, No Dyspnea


Cardiac: No Chest Pain, No Edema, No Syncope


Abdominal/Gastrointestinal: No Abdominal Pain, No Nausea, No Vomiting, No 

Diarrhea


Genitourinary Symptoms: No Dysuria


Musculoskeletal: Other (Review of systems obtained via nursing home staff as 

patient has dementia.  Bruising to left upper extremity as described in HPI.), 

No Back Pain, No Neck Pain


Skin: No Rash


Neurological: No Dizziness, No Focal Weakness, No Sensory Changes


Psychological: No Symptoms


Endocrine: No Symptoms


All Other Systems: Reviewed and Negative





- Past Medical History


Pertinent Past Medical History: Yes


Neurological History: Alzheimer's Disease, Dementia, Stroke


ENT History: Cataracts


Cardiac History: High Cholesterol, Hypertension


Respiratory History: CHF


Endocrine Medical History: Diabetes Type I


Musculoskeletal History: Osteoarthritis


GI Medical History: No Pertinent History


 History: No Pertinent History


Psycho-Social History: Depression


Female Reproductive Disorders: No Pertinent History


Other Medical History: PER [facility] RECORDS





- Past Surgical History


Past Surgical History: No


Neuro Surgical History: No Pertinent History


Cardiac: No Pertinent History


Respiratory: No Pertinent History


Gastrointestinal: No Pertinent History


Genitourinary: No Pertinent History


Musculoskeletal: No Pertinent History


Female Surgical History: No Pertinent History


Other Surgical History: PER [facility] RECORDS





- Social History


Smoking Status: Unknown if ever smoked


Exposure to second hand smoke: No


Drug Use: none


Patient Lives Alone: No





- Nursing Vital Signs


Nursing Vital Signs: 


                               Initial Vital Signs











Temperature  97.8 F   11/01/21 22:26


 


Pulse Rate  74   11/01/21 22:26


 


Respiratory Rate  18   11/01/21 22:26


 


Blood Pressure  172/92   11/01/21 22:26


 


O2 Sat by Pulse Oximetry  97   11/01/21 22:26








                                   Pain Scale











Pain Intensity                 4

















- Physical Exam


**SpO2 Interpretation**: normal


SpO2: 97


Comments: 





11/01/21 23:09


Physical Exam


Vitals signsand nursing notereviewed.


Constitutional: 


Appearance: She is well-developed.


HENT:


Head: Normocephalicand atraumatic.


Eyes:


Conjunctiva/sclera: Conjunctivae normal.


Neck:


Musculoskeletal: Normal range of motion.


Trachea: No tracheal deviation.


Cardiovascular:


Rate and Rhythm: Normal rate.


Pulmonary:


Effort: Pulmonary effort is normal. Norespiratory distress.


Abdominal:


Palpations: Abdomen is soft.


Musculoskeletal: 


General: No deformity.


Skin:


General: Skin is warmand dry.


Neurological:


Mental Status: She is alert.


Psychiatric: 


Behavior: Behaviornormal.





Left upper extremity bruising and tenderness.  Abrasions over left elbow.  Left 

hand bruising.  Full range of motion tenderness to palpation.  2+ pulses, 2+ cap

 refill





- Course


Nursing assessment & vital signs reviewed: Yes


Ordered Tests: 


                               Active Orders 24 hr











 Category Date Time Status


 


 CHEST 1 VIEW (PORTABLE) Stat Exams  11/01/21 23:10 Taken


 


 ELBOW (2 VIEW) Stat Exams  11/01/21 Taken


 


 HAND (MINIMUM 3 VIEWS) Stat Exams  11/01/21 00:00 Taken


 


 HEAD WITHOUT CONTRAST [CT] Stat Exams  11/01/21 22:36 Taken


 


 HUMERUS Stat Exams  11/01/21 Taken


 


 WRIST (MIN 3 VIEWS) Stat Exams  11/01/21 Taken














- Progress


Progress: improved


Progress Note: 





11/01/21 23:10


We will obtain head CT given unwitnessed fall, x-ray of left humerus, left 

elbow, left hand.


11/02/21 00:39


CT head was unremarkable.  Patient has possible radial neck fracture.  Will need

 a repeat x-ray.  Obvious left humerus fracture.  Patient was placed in a sling.

  We will discharge her home at this point in time.  I do not believe that she 

would tolerate a long-arm splint secondary to her dementia and consistent moving

 around.  She will need close follow-up with orthopedic surgery.  Return here 

for any new or changing symptoms.  Nursing home instructed on fall prevention 

and fracture care.


Counseled pt/family regarding: diagnosis, need for follow-up, rad results





- Departure


Departure Disposition: Home


Clinical Impression: 


 Closed left humeral fracture, Fracture of radial neck, closed





Condition: Stable


Critical Care Time: No


Referrals: 


ERNESTO PORRAS MD [Primary Care Provider] - 


Instructions:  Preventing Falls


Additional Instructions: 


Patient will need close follow-up with orthopedic surgery in the next 2 to 3 

days.  Repeat imaging of neck of the radius.  Possible subtle fracture.  Patient

paced in a sling as she could not tolerate a long-arm splint.  Close follow-up 

with Ortho as described.  Return here for new or changing symptoms.

## 2021-11-02 VITALS — SYSTOLIC BLOOD PRESSURE: 160 MMHG | OXYGEN SATURATION: 96 % | DIASTOLIC BLOOD PRESSURE: 88 MMHG | HEART RATE: 80 BPM

## 2021-11-02 NOTE — XRAY
Indication: Pain following fall.



Comparison: None



3 view left wrist demonstrates osteopenia, radiocarpal joint space narrowing

and moderate/advanced 1st metacarpal multangular scaphoid degenerative

changes.  No other bony, articular, or soft tissue abnormalities.



Comment: Preliminary interpretation made by [facility].  No critical discrepancy.

## 2021-11-02 NOTE — XRAY
Indication: Pain following fall.



Comparison: None



Portable chest remains hyperinflated and clear.  Heart borderline enlarged.

Bony thorax again demonstrates osteopenia and degenerative changes.  Left

humerus neck fracture reported separately.



Impression: Borderline cardiomegaly and left humerus fracture.



Comment: Preliminary interpretation made by [facility].  No critical discrepancy.

## 2021-11-02 NOTE — XRAY
Indication: Pain following fall.



Comparison: None



2 view left elbow demonstrates osteopenia.  No other bony, articular, or soft

tissue abnormalities.



Comment: Preliminary interpretation made by [facility].  No critical discrepancy.

## 2021-11-02 NOTE — XRAY
Indication: Pain following fall.



Comparison: None



3 view left hand demonstrates ring base 4th finger, osteopenia, radiocarpal

joint space narrowing, moderate/advanced 1st metacarpal multangular scaphoid

degenerative changes, and mild 5th PIP degenerative changes.  No other bony,

articular, or soft tissue abnormalities.



Comment: Preliminary interpretation made by [facility].  No critical discrepancy.

## 2021-11-02 NOTE — XRAY
Indication: Status post fall.



Multiple contiguous axial images obtained through the head without contrast.



Comparison: October 28, 2021.



Images near the vertex are limited due to motion artifact.  Grossly stable

age-appropriate global atrophy and moderate periventricular degenerative

micro-ischemia.  No gross acute intracranial hemorrhage, abnormal extra-axial

fluid collection, or mass effect.  Fourth ventricle is midline.  Bony

calvarium grossly intact.  Visualized paranasal sinuses are clear.



Impression: Motion artifact.  Grossly stable nonacute senile brain.



Comment: Preliminary interpretation made by [facility].  No critical discrepancy.

## 2021-11-02 NOTE — XRAY
Indication: Pain following fall.



Comparison: None



2 view left humerus demonstrates osteopenia and comminuted impacted humeral

neck fracture.  No other bony, articular, or soft tissue abnormalities.



Comment: Preliminary interpretation made by [facility].  No critical discrepancy.

## 2021-11-05 ENCOUNTER — HOSPITAL ENCOUNTER (EMERGENCY)
Dept: HOSPITAL 33 - ED | Age: 76
Discharge: NURSING, REHAB OR LONG-TERM CARE | End: 2021-11-05
Payer: MEDICARE

## 2021-11-05 VITALS — OXYGEN SATURATION: 100 % | HEART RATE: 72 BPM

## 2021-11-05 VITALS — SYSTOLIC BLOOD PRESSURE: 145 MMHG | DIASTOLIC BLOOD PRESSURE: 71 MMHG

## 2021-11-05 DIAGNOSIS — I10: ICD-10-CM

## 2021-11-05 DIAGNOSIS — G30.0: ICD-10-CM

## 2021-11-05 DIAGNOSIS — F02.80: ICD-10-CM

## 2021-11-05 DIAGNOSIS — E10.649: Primary | ICD-10-CM

## 2021-11-05 LAB
ALBUMIN SERPL-MCNC: 2.8 G/DL (ref 3.5–5)
ALP SERPL-CCNC: 107 U/L (ref 38–126)
ALT SERPL-CCNC: 42 U/L (ref 0–35)
AMPHETAMINES UR QL: NEGATIVE
ANION GAP SERPL CALC-SCNC: 10.3 MEQ/L (ref 5–15)
ANION GAP SERPL CALC-SCNC: 9.3 MEQ/L (ref 5–15)
AST SERPL QL: 34 U/L (ref 14–36)
BARBITURATES UR QL: NEGATIVE
BASOPHILS # BLD AUTO: 0.01 10*3/UL (ref 0–0.4)
BASOPHILS NFR BLD AUTO: 0.1 % (ref 0–0.4)
BENZODIAZ UR QL SCN: NEGATIVE
BILIRUB BLD-MCNC: 0.5 MG/DL (ref 0.2–1.3)
BUN SERPL-MCNC: 14 MG/DL (ref 7–17)
BUN SERPL-MCNC: 15 MG/DL (ref 7–17)
CALCIUM SPEC-MCNC: 8.3 MG/DL (ref 8.4–10.2)
CALCIUM SPEC-MCNC: 8.8 MG/DL (ref 8.4–10.2)
CHLORIDE SERPL-SCNC: 95 MMOL/L (ref 98–107)
CHLORIDE SERPL-SCNC: 96 MMOL/L (ref 98–107)
CO2 SERPL-SCNC: 32 MMOL/L (ref 22–30)
CO2 SERPL-SCNC: 35 MMOL/L (ref 22–30)
COCAINE UR QL SCN: NEGATIVE
CREAT SERPL-MCNC: 0.37 MG/DL (ref 0.52–1.04)
CREAT SERPL-MCNC: 0.39 MG/DL (ref 0.52–1.04)
EOSINOPHIL # BLD AUTO: 0.01 10*3/UL (ref 0–0.5)
GFR SERPLBLD BASED ON 1.73 SQ M-ARVRAT: > 60 ML/MIN
GFR SERPLBLD BASED ON 1.73 SQ M-ARVRAT: > 60 ML/MIN
GLUCOSE SERPL-MCNC: 171 MG/DL (ref 74–106)
GLUCOSE SERPL-MCNC: 95 MG/DL (ref 74–106)
GLUCOSE UR-MCNC: >=500 MG/DL
HCT VFR BLD AUTO: 31.8 % (ref 35–47)
HGB BLD-MCNC: 9.8 GM/DL (ref 12–16)
LYMPHOCYTES # SPEC AUTO: 0.91 10*3/UL (ref 1–4.6)
MCH RBC QN AUTO: 29.3 PG (ref 26–32)
MCHC RBC AUTO-ENTMCNC: 30.8 G/DL (ref 32–36)
METHADONE UR QL: NEGATIVE
MONOCYTES # BLD AUTO: 0.93 10*3/UL (ref 0–1.3)
OPIATES UR QL: NEGATIVE
PCP UR QL CFM>20 NG/ML: NEGATIVE
PLATELET # BLD AUTO: 162 K/MM3 (ref 150–450)
POTASSIUM SERPLBLD-SCNC: 3.6 MMOL/L (ref 3.5–5.1)
POTASSIUM SERPLBLD-SCNC: 4.1 MMOL/L (ref 3.5–5.1)
PROT SERPL-MCNC: 5.5 G/DL (ref 6.3–8.2)
PROT UR STRIP-MCNC: 100 MG/DL
RBC # BLD AUTO: 3.35 M/MM3 (ref 4.1–5.4)
RBC #/AREA URNS HPF: (no result) /HPF (ref 0–2)
SODIUM SERPL-SCNC: 134 MMOL/L (ref 137–145)
SODIUM SERPL-SCNC: 135 MMOL/L (ref 137–145)
THC UR QL SCN: NEGATIVE
WBC # BLD AUTO: 10.7 K/MM3 (ref 4–10.5)
WBC #/AREA URNS HPF: (no result) /HPF (ref 0–5)

## 2021-11-05 PROCEDURE — 85025 COMPLETE CBC W/AUTO DIFF WBC: CPT

## 2021-11-05 PROCEDURE — 84484 ASSAY OF TROPONIN QUANT: CPT

## 2021-11-05 PROCEDURE — 81001 URINALYSIS AUTO W/SCOPE: CPT

## 2021-11-05 PROCEDURE — 80053 COMPREHEN METABOLIC PANEL: CPT

## 2021-11-05 PROCEDURE — 70450 CT HEAD/BRAIN W/O DYE: CPT

## 2021-11-05 PROCEDURE — 87086 URINE CULTURE/COLONY COUNT: CPT

## 2021-11-05 PROCEDURE — 80307 DRUG TEST PRSMV CHEM ANLYZR: CPT

## 2021-11-05 PROCEDURE — 82947 ASSAY GLUCOSE BLOOD QUANT: CPT

## 2021-11-05 PROCEDURE — 99284 EMERGENCY DEPT VISIT MOD MDM: CPT

## 2021-11-05 PROCEDURE — 36415 COLL VENOUS BLD VENIPUNCTURE: CPT

## 2021-11-05 PROCEDURE — 80048 BASIC METABOLIC PNL TOTAL CA: CPT

## 2021-11-05 PROCEDURE — 71045 X-RAY EXAM CHEST 1 VIEW: CPT

## 2021-11-05 PROCEDURE — 93005 ELECTROCARDIOGRAM TRACING: CPT

## 2021-11-05 NOTE — XRAY
Indication: Confusion.  Dementia.  Uncooperative.



Multiple contiguous axial images obtained through the head without contrast.



Comparison: November 1, 2021.



There remains age-appropriate global atrophy and moderate periventricular

degenerative micro-ischemia bilaterally.  No acute intracranial hemorrhage,

abnormal extra-axial fluid collection, or mass effect.  Fourth ventricle is

midline without hydrocephalus.  Bony calvarium intact.  Visualized paranasal

sinuses and mastoid air cells are clear.



Impression: Continued nonacute senile brain.

## 2021-11-05 NOTE — ERPHSYRPT
- History of Present Illness


Source: EMS


Exam Limitations: other (Pt demented/Poor historian)


Patient Subjective Stated Complaint: To ER c/o hypoglycemia pt from [place]

ems called for glucose this am of 34 then 45 after glucogan pt was lethargic at 

time.


Triage Nursing Assessment: pt arrive pale/cool dry resp easy non labored pt is 

lethargic though easily arousable pt has significant swelling noted to left 

upper arm from a fall and fx 1 week pta. Sling in place and arm elevatedon 

pillow pt tolerated with some difficulty. pt alert when woken though oriented 

x0. felisha ue edema noted 3+


Physician History: 





75 yo wf from [facility] w hypoglycemia. Pt's glucose was 37 which was treated w 1amp 

D50 per EMS. Glucose greater than 300 per EMT's after D50. Pt arrived lethargic 

but maintaining airway. She is oriented to name only. NH asks that rings be 

removed from L 4th digit due to edema from LUE fx. 


Timing/Duration: today


Severity: moderate


Modifying Factors: Worsens With: cold therapy, eating, immobilization, 

medication, movement, rest, ibuprofen


Associated Symptoms: weakness, No nausea, No vomiting, No abdominal pain, No 

shortness of breath, No heartburn, No diaphoresis, No cough, No chills, No chest

pain, No fever, No headaches, No loss of appetite, No malaise, No rash, No 

syncope, No seizure


Allergies/Adverse Reactions: 








No Known Drug Allergies Allergy (Verified 11/01/21 22:49)


   





Home Medications: 








Acetaminophen 325 mg*** [Tylenol 325 mg***] 650 mg PO Q4HPRN PRN 10/27/21 [His

tory]


Aspirin EC 81 mg*** [Ecotrin 81 mg***] 81 mg PO DAILY 10/27/21 [History]


Atorvastatin Calcium 40 mg PO HS 10/27/21 [History]


Clonidine HCl 0.2 mg PO BID 10/27/21 [History]


Donepezil HCl 10 mg*** [Aricept 10 MG***] 10 mg PO HS 10/27/21 [History]


Glucagon 1 mg*** [GlucaGen 1 MG***] 1 mg IM UD 10/27/21 [History]


Hydralazine HCl 10 mg PO TID 10/27/21 [History]


Insulin Lispro [Humalog] 1 units SQ UD 10/27/21 [History]


Lisinopril 40 mg PO DAILY 10/27/21 [History]


Magnesium Hydroxide 30 ml*** [Milk of Magnesia 30 ml***] 30 ml PO DAILY PRN PRN 

10/27/21 [History]


Magnesium Oxide [Mag-Oxide] 200 mg PO DAILY 10/27/21 [History]


Memantine HCl 5 mg PO BID 10/27/21 [History]


Metoprolol Succinate 50 mg*** [Toprol Xl 50 MG***] 50 mg PO BID 10/27/21 

[History]


Multivit,Calc,Mins/Iron/Folic [Therems-M Tablet] 1 tab PO DAILY 10/27/21 

[History]


Nitroglycerin 0.4 mg Tablet*** [Nitrostat 0.4 MG Tablet***] 0.4 mg SL Q5MIN PRN 

MR X 3 PRN 10/27/21 [History]


Ondansetron ODT 4 MG*** [Zofran Odt 4 mg***] 4 mg PO Q6H PRN PRN 10/27/21 

[History]


Sertraline HCl 50 mg** [Zoloft 50 mg Tablet**] 50 mg PO HS 10/27/21 [History]


Vit A/Vit C/Vit E/Zinc/Copper [Preservision Areds Softgel] 1 cap PO DAILY 

10/27/21 [History]


Insulin Glargine** [Lantus Insulin**] 22 unit SQ HS 11/05/21 [History]





Hx Tetanus, Diphtheria Vaccination/Date Given:  (Unkown)


Hx Influenza Vaccination/Date Given:  (Unknown)


Hx Pneumococcal Vaccination/Date Given:  (Unknown)





Travel Risk





- International Travel


Have you traveled outside of the country in past 3 weeks: No





- Coronavirus Screening


Are you exhibiting any of the following symptoms?: No


Close contact with a COVID-19 positive Pt in past 14-21 Days: No





- Vaccine Status


Have you recieved a Covid-19 vaccination: Yes


: Unknown





- Vaccination Dates


Dates if Unknown: 3/1/2021 and 3/30/2021





- Review of Systems


Constitutional: No Symptoms


Eyes: No Symptoms


Ears, Nose, & Throat: No Symptoms


Respiratory: No Symptoms


Cardiac: No Symptoms


Abdominal/Gastrointestinal: No Symptoms


Genitourinary Symptoms: No Symptoms


Musculoskeletal: No Symptoms


Skin: No Symptoms


Neurological: Lethargy, No Dizziness, No Focal Weakness, No Gait Changes, No 

Headache, No Irritability, No Paralysis, No Parasthesia, No Seizure, No Sensory 

Changes, No Speech Changes, No Tics, No Tremors, No Vertigo


Psychological: No Symptoms


Endocrine: No Symptoms


Hematologic/Lymphatic: No Symptoms


Immunological/Allergic: No Symptoms





- Past Medical History


Pertinent Past Medical History: Yes


Neurological History: Alzheimer's Disease, Dementia, Stroke


ENT History: Cataracts


Cardiac History: High Cholesterol, Hypertension


Respiratory History: CHF


Endocrine Medical History: Diabetes Type I


Musculoskeletal History: Osteoarthritis


GI Medical History: No Pertinent History


 History: No Pertinent History


Psycho-Social History: Depression


Female Reproductive Disorders: No Pertinent History


Other Medical History: PER [facility] RECORDS





- Past Surgical History


Past Surgical History: No


Neuro Surgical History: No Pertinent History


Cardiac: No Pertinent History


Respiratory: No Pertinent History


Gastrointestinal: No Pertinent History


Genitourinary: No Pertinent History


Musculoskeletal: No Pertinent History


Female Surgical History: No Pertinent History


Other Surgical History: PER [facility] RECORDS





- Social History


Smoking Status: Unknown if ever smoked


Exposure to second hand smoke: No


Drug Use: none


Patient Lives Alone: No


Significant Family History: no pertinent family hx





- Nursing Vital Signs


Nursing Vital Signs: 


                               Initial Vital Signs











Temperature  97 F   11/05/21 09:48


 


Pulse Rate  70   11/05/21 09:48


 


Respiratory Rate  18   11/05/21 09:48


 


Blood Pressure  168/79   11/05/21 09:48


 


O2 Sat by Pulse Oximetry  99   11/05/21 09:48








                                   Pain Scale











Pain Intensity                 0











Hypertensive





- Physical Exam


General Appearance: no apparent distress, lethargy


Eye Exam: PERRL/EOMI, eyes nml inspection


Ears, Nose, Throat Exam: normal ENT inspection, TMs normal, pharynx normal, 

moist mucous membranes


Neck Exam: normal inspection, non-tender, supple, full range of motion, No 

meningismus, No mass, No Brudzinski, No Kernig's, No carotid bruit


Respiratory Exam: normal breath sounds, lungs clear, airway intact, No 

respiratory distress


Cardiovascular Exam: regular rate/rhythm, normal heart sounds, No murmur


Gastrointestinal/Abdomen Exam: soft, normal bowel sounds, No tenderness


Back Exam: normal inspection, normal range of motion, No CVA tenderness


Extremity Exam: other (LUE in sling from previous fracture)


Neurologic Exam: cooperative, other (Lethargic, oriented to person), No motor 

deficits, No sensory deficit


Skin Exam: normal color, warm, No dry


Lymphatic Exam: No adenopathy


**SpO2 Interpretation**: normal


SpO2: 98


O2 Delivery: Room Air





- Course


EKG Interpreted by Me: RATE (NSR/R67/Prolonged QTc/Poor R wave progression V2-

V3/Flat T waves)





- Radiology Exams


  ** Chest


X-ray Interpretation: Discussed w/ radiologist (NAD)





- CT Exams


  ** Head


CT Interpretation: Discussed w/radiologist (NAD)


Ordered Tests: 


                               Active Orders 24 hr











 Category Date Time Status


 


 EKG-ER Only STAT Care  11/05/21 11:28 Completed


 


 CHEST 1 VIEW (PORTABLE) Stat Exams  11/05/21 15:47 Completed


 


 HEAD WITHOUT CONTRAST [CT] Stat Exams  11/05/21 15:53 Completed


 


 BMP Stat Lab  11/05/21 13:50 Completed


 


 CBC W DIFF Stat Lab  11/05/21 10:50 Completed


 


 CMP Stat Lab  11/05/21 10:50 Completed


 


 CULTURE,URINE Stat Lab  11/05/21 11:35 Received


 


 POCT GLUCOSE Stat Lab  11/05/21 10:21 Completed


 


 POCT GLUCOSE Stat Lab  11/05/21 15:51 Completed


 


 POCT GLUCOSE Stat Lab  11/05/21 17:30 Completed


 


 POCT GLUCOSE Stat Lab  11/05/21 18:39 Completed


 


 TROPONIN Q3H Lab  11/05/21 10:50 Completed


 


 TROPONIN Q3H Lab  11/05/21 13:50 Completed


 


 TROPONIN Q3H Lab  11/05/21 16:50 Completed


 


 UA W/RFX UR CULTURE Stat Lab  11/05/21 11:35 Completed


 


 Urine Triage Profile Stat Lab  11/05/21 11:35 Completed








Medication Summary














Discontinued Medications














Generic Name Dose Route Start Last Admin





  Trade Name Freq  PRN Reason Stop Dose Admin


 


Sodium Chloride  500 mls @ 500 mls/hr  11/05/21 18:10  11/05/21 18:15





  Sodium Chloride 0.9% 500 Ml  IV  11/05/21 19:09  500 mls/hr





  .Q1H ONE   Administration


 


Sodium Chloride  Confirm  11/05/21 18:11 





  Sodium Chloride 0.9% 500 Ml  Administered  11/05/21 18:12 





  Dose  





  500 mls @ ud  





  IV  





  .STK-MED ONE  











Lab/Rad Data: 


                           Laboratory Result Diagrams





                                 11/05/21 10:50 





                                 11/05/21 13:50 





                               Laboratory Results











  11/05/21 11/05/21 11/05/21 Range/Units





  18:39 17:30 16:50 


 


WBC     (4.0-10.5)  K/mm3


 


RBC     (4.1-5.4)  M/mm3


 


Hgb     (12.0-16.0)  gm/dl


 


Hct     (35-47)  %


 


MCV     ()  fl


 


MCH     (26-32)  pg


 


MCHC     (32-36)  g/dl


 


RDW     (11.5-14.0)  %


 


Plt Count     (150-450)  K/mm3


 


MPV     (7.5-11.0)  fl


 


Gran %     (36.0-66.0)  %


 


Eos # (Auto)     (0-0.5)  


 


Absolute Lymphs (auto)     (1.0-4.6)  


 


Absolute Monos (auto)     (0.0-1.3)  


 


Lymphocytes %     (24.0-44.0)  %


 


Monocytes %     (0.0-12.0)  %


 


Eosinophils %     (0.00-5.0)  %


 


Basophils %     (0.0-0.4)  %


 


Absolute Granulocytes     (1.4-6.9)  


 


Basophils #     (0-0.4)  


 


Sodium     (137-145)  mmol/L


 


Potassium     (3.5-5.1)  mmol/L


 


Chloride     ()  mmol/L


 


Carbon Dioxide     (22-30)  mmol/L


 


Anion Gap     (5-15)  MEQ/L


 


BUN     (7-17)  mg/dL


 


Creatinine     (0.52-1.04)  mg/dL


 


Estimated GFR     ML/MIN


 


Glucose     ()  mg/dL


 


POC Glucometer  107 H  121 H   (74 to 106)  mg/dL


 


Calcium     (8.4-10.2)  mg/dL


 


Total Bilirubin     (0.2-1.3)  mg/dL


 


AST     (14-36)  U/L


 


ALT     (0-35)  U/L


 


Alkaline Phosphatase     ()  U/L


 


Troponin I    1.400 H*  (0.000-0.034)  ng/mL


 


Serum Total Protein     (6.3-8.2)  g/dL


 


Albumin     (3.5-5.0)  g/dL


 


Urine Color     (YELLOW)  


 


Urine Appearance     (CLEAR)  


 


Urine pH     (5-6)  


 


Ur Specific Gravity     (1.005-1.025)  


 


Urine Protein     (Negative)  


 


Urine Ketones     (NEGATIVE)  


 


Urine Blood     (0-5)  Jose/ul


 


Urine Nitrite     (NEGATIVE)  


 


Urine Bilirubin     (NEGATIVE)  


 


Urine Urobilinogen     (0-1)  mg/dL


 


Ur Leukocyte Esterase     (NEGATIVE)  


 


Urine WBC (Auto)     (0-5)  /HPF


 


Urine RBC (Auto)     (0-2)  /HPF


 


U Hyaline Cast (Auto)     (0-2)  /LPF


 


U Epithel Cells (Auto)     (FEW)  /HPF


 


Urine Bacteria (Auto)     (NEGATIVE)  /HPF


 


Urine Mucus (Auto)     (NEGATIVE)  /HPF


 


Urine Culture Reflexed     (NO)  


 


Urine Glucose     (NEGATIVE)  mg/dL


 


Urine Opiates Level     (NEGATIVE)  


 


Ur Methadone     (NEGATIVE)  


 


Urine Barbiturates     (NEGATIVE)  


 


Ur Phencyclidine (PCP)     (NEGATIVE)  


 


Urine Amphetamine     (NEGATIVE)  


 


U Benzodiazepine Level     (NEGATIVE)  


 


Urine Cocaine     (NEGATIVE)  


 


Urine Marijuana (THC)     (NEGATIVE)  














  11/05/21 11/05/21 11/05/21 Range/Units





  15:51 13:50 13:50 


 


WBC     (4.0-10.5)  K/mm3


 


RBC     (4.1-5.4)  M/mm3


 


Hgb     (12.0-16.0)  gm/dl


 


Hct     (35-47)  %


 


MCV     ()  fl


 


MCH     (26-32)  pg


 


MCHC     (32-36)  g/dl


 


RDW     (11.5-14.0)  %


 


Plt Count     (150-450)  K/mm3


 


MPV     (7.5-11.0)  fl


 


Gran %     (36.0-66.0)  %


 


Eos # (Auto)     (0-0.5)  


 


Absolute Lymphs (auto)     (1.0-4.6)  


 


Absolute Monos (auto)     (0.0-1.3)  


 


Lymphocytes %     (24.0-44.0)  %


 


Monocytes %     (0.0-12.0)  %


 


Eosinophils %     (0.00-5.0)  %


 


Basophils %     (0.0-0.4)  %


 


Absolute Granulocytes     (1.4-6.9)  


 


Basophils #     (0-0.4)  


 


Sodium   135 L   (137-145)  mmol/L


 


Potassium   4.1   (3.5-5.1)  mmol/L


 


Chloride   95 L   ()  mmol/L


 


Carbon Dioxide   35 H   (22-30)  mmol/L


 


Anion Gap   10.3   (5-15)  MEQ/L


 


BUN   15   (7-17)  mg/dL


 


Creatinine   0.37 L   (0.52-1.04)  mg/dL


 


Estimated GFR   > 60.0   ML/MIN


 


Glucose   95   ()  mg/dL


 


POC Glucometer  72 L    (74 to 106)  mg/dL


 


Calcium   8.8   (8.4-10.2)  mg/dL


 


Total Bilirubin     (0.2-1.3)  mg/dL


 


AST     (14-36)  U/L


 


ALT     (0-35)  U/L


 


Alkaline Phosphatase     ()  U/L


 


Troponin I    1.480 H*  (0.000-0.034)  ng/mL


 


Serum Total Protein     (6.3-8.2)  g/dL


 


Albumin     (3.5-5.0)  g/dL


 


Urine Color     (YELLOW)  


 


Urine Appearance     (CLEAR)  


 


Urine pH     (5-6)  


 


Ur Specific Gravity     (1.005-1.025)  


 


Urine Protein     (Negative)  


 


Urine Ketones     (NEGATIVE)  


 


Urine Blood     (0-5)  Jose/ul


 


Urine Nitrite     (NEGATIVE)  


 


Urine Bilirubin     (NEGATIVE)  


 


Urine Urobilinogen     (0-1)  mg/dL


 


Ur Leukocyte Esterase     (NEGATIVE)  


 


Urine WBC (Auto)     (0-5)  /HPF


 


Urine RBC (Auto)     (0-2)  /HPF


 


U Hyaline Cast (Auto)     (0-2)  /LPF


 


U Epithel Cells (Auto)     (FEW)  /HPF


 


Urine Bacteria (Auto)     (NEGATIVE)  /HPF


 


Urine Mucus (Auto)     (NEGATIVE)  /HPF


 


Urine Culture Reflexed     (NO)  


 


Urine Glucose     (NEGATIVE)  mg/dL


 


Urine Opiates Level     (NEGATIVE)  


 


Ur Methadone     (NEGATIVE)  


 


Urine Barbiturates     (NEGATIVE)  


 


Ur Phencyclidine (PCP)     (NEGATIVE)  


 


Urine Amphetamine     (NEGATIVE)  


 


U Benzodiazepine Level     (NEGATIVE)  


 


Urine Cocaine     (NEGATIVE)  


 


Urine Marijuana (THC)     (NEGATIVE)  














  11/05/21 11/05/21 11/05/21 Range/Units





  11:35 11:35 10:50 


 


WBC     (4.0-10.5)  K/mm3


 


RBC     (4.1-5.4)  M/mm3


 


Hgb     (12.0-16.0)  gm/dl


 


Hct     (35-47)  %


 


MCV     ()  fl


 


MCH     (26-32)  pg


 


MCHC     (32-36)  g/dl


 


RDW     (11.5-14.0)  %


 


Plt Count     (150-450)  K/mm3


 


MPV     (7.5-11.0)  fl


 


Gran %     (36.0-66.0)  %


 


Eos # (Auto)     (0-0.5)  


 


Absolute Lymphs (auto)     (1.0-4.6)  


 


Absolute Monos (auto)     (0.0-1.3)  


 


Lymphocytes %     (24.0-44.0)  %


 


Monocytes %     (0.0-12.0)  %


 


Eosinophils %     (0.00-5.0)  %


 


Basophils %     (0.0-0.4)  %


 


Absolute Granulocytes     (1.4-6.9)  


 


Basophils #     (0-0.4)  


 


Sodium     (137-145)  mmol/L


 


Potassium     (3.5-5.1)  mmol/L


 


Chloride     ()  mmol/L


 


Carbon Dioxide     (22-30)  mmol/L


 


Anion Gap     (5-15)  MEQ/L


 


BUN     (7-17)  mg/dL


 


Creatinine     (0.52-1.04)  mg/dL


 


Estimated GFR     ML/MIN


 


Glucose     ()  mg/dL


 


POC Glucometer     (74 to 106)  mg/dL


 


Calcium     (8.4-10.2)  mg/dL


 


Total Bilirubin     (0.2-1.3)  mg/dL


 


AST     (14-36)  U/L


 


ALT     (0-35)  U/L


 


Alkaline Phosphatase     ()  U/L


 


Troponin I    1.440 H*  (0.000-0.034)  ng/mL


 


Serum Total Protein     (6.3-8.2)  g/dL


 


Albumin     (3.5-5.0)  g/dL


 


Urine Color  YELLOW    (YELLOW)  


 


Urine Appearance  CLEAR    (CLEAR)  


 


Urine pH  6.0    (5-6)  


 


Ur Specific Gravity  1.018    (1.005-1.025)  


 


Urine Protein  100    (Negative)  


 


Urine Ketones  TRACE    (NEGATIVE)  


 


Urine Blood  NEGATIVE    (0-5)  Jose/ul


 


Urine Nitrite  NEGATIVE    (NEGATIVE)  


 


Urine Bilirubin  NEGATIVE    (NEGATIVE)  


 


Urine Urobilinogen  NEGATIVE    (0-1)  mg/dL


 


Ur Leukocyte Esterase  NEGATIVE    (NEGATIVE)  


 


Urine WBC (Auto)  0-2    (0-5)  /HPF


 


Urine RBC (Auto)  NONE    (0-2)  /HPF


 


U Hyaline Cast (Auto)  0-2    (0-2)  /LPF


 


U Epithel Cells (Auto)  NONE    (FEW)  /HPF


 


Urine Bacteria (Auto)  NONE    (NEGATIVE)  /HPF


 


Urine Mucus (Auto)  SLIGHT    (NEGATIVE)  /HPF


 


Urine Culture Reflexed  ORDERED SEPARATELY    (NO)  


 


Urine Glucose  >=500    (NEGATIVE)  mg/dL


 


Urine Opiates Level   NEGATIVE   (NEGATIVE)  


 


Ur Methadone   NEGATIVE   (NEGATIVE)  


 


Urine Barbiturates   NEGATIVE   (NEGATIVE)  


 


Ur Phencyclidine (PCP)   NEGATIVE   (NEGATIVE)  


 


Urine Amphetamine   NEGATIVE   (NEGATIVE)  


 


U Benzodiazepine Level   NEGATIVE   (NEGATIVE)  


 


Urine Cocaine   NEGATIVE   (NEGATIVE)  


 


Urine Marijuana (THC)   NEGATIVE   (NEGATIVE)  














  11/05/21 11/05/21 11/05/21 Range/Units





  10:50 10:50 10:21 


 


WBC   10.7 H   (4.0-10.5)  K/mm3


 


RBC   3.35 L   (4.1-5.4)  M/mm3


 


Hgb   9.8 L   (12.0-16.0)  gm/dl


 


Hct   31.8 L   (35-47)  %


 


MCV   94.9   ()  fl


 


MCH   29.3   (26-32)  pg


 


MCHC   30.8 L   (32-36)  g/dl


 


RDW   14.4 H   (11.5-14.0)  %


 


Plt Count   162   (150-450)  K/mm3


 


MPV   12.2 H   (7.5-11.0)  fl


 


Gran %   82.6 H   (36.0-66.0)  %


 


Eos # (Auto)   0.01   (0-0.5)  


 


Absolute Lymphs (auto)   0.91 L   (1.0-4.6)  


 


Absolute Monos (auto)   0.93   (0.0-1.3)  


 


Lymphocytes %   8.5 L   (24.0-44.0)  %


 


Monocytes %   8.7   (0.0-12.0)  %


 


Eosinophils %   0.1   (0.00-5.0)  %


 


Basophils %   0.1   (0.0-0.4)  %


 


Absolute Granulocytes   8.81 H   (1.4-6.9)  


 


Basophils #   0.01   (0-0.4)  


 


Sodium  134 L    (137-145)  mmol/L


 


Potassium  3.6    (3.5-5.1)  mmol/L


 


Chloride  96 L    ()  mmol/L


 


Carbon Dioxide  32 H    (22-30)  mmol/L


 


Anion Gap  9.3    (5-15)  MEQ/L


 


BUN  14    (7-17)  mg/dL


 


Creatinine  0.39 L    (0.52-1.04)  mg/dL


 


Estimated GFR  > 60.0    ML/MIN


 


Glucose  171 H    ()  mg/dL


 


POC Glucometer    188 H  (74 to 106)  mg/dL


 


Calcium  8.3 L    (8.4-10.2)  mg/dL


 


Total Bilirubin  0.50    (0.2-1.3)  mg/dL


 


AST  34    (14-36)  U/L


 


ALT  42 H    (0-35)  U/L


 


Alkaline Phosphatase  107    ()  U/L


 


Troponin I     (0.000-0.034)  ng/mL


 


Serum Total Protein  5.5 L    (6.3-8.2)  g/dL


 


Albumin  2.8 L    (3.5-5.0)  g/dL


 


Urine Color     (YELLOW)  


 


Urine Appearance     (CLEAR)  


 


Urine pH     (5-6)  


 


Ur Specific Gravity     (1.005-1.025)  


 


Urine Protein     (Negative)  


 


Urine Ketones     (NEGATIVE)  


 


Urine Blood     (0-5)  Jose/ul


 


Urine Nitrite     (NEGATIVE)  


 


Urine Bilirubin     (NEGATIVE)  


 


Urine Urobilinogen     (0-1)  mg/dL


 


Ur Leukocyte Esterase     (NEGATIVE)  


 


Urine WBC (Auto)     (0-5)  /HPF


 


Urine RBC (Auto)     (0-2)  /HPF


 


U Hyaline Cast (Auto)     (0-2)  /LPF


 


U Epithel Cells (Auto)     (FEW)  /HPF


 


Urine Bacteria (Auto)     (NEGATIVE)  /HPF


 


Urine Mucus (Auto)     (NEGATIVE)  /HPF


 


Urine Culture Reflexed     (NO)  


 


Urine Glucose     (NEGATIVE)  mg/dL


 


Urine Opiates Level     (NEGATIVE)  


 


Ur Methadone     (NEGATIVE)  


 


Urine Barbiturates     (NEGATIVE)  


 


Ur Phencyclidine (PCP)     (NEGATIVE)  


 


Urine Amphetamine     (NEGATIVE)  


 


U Benzodiazepine Level     (NEGATIVE)  


 


Urine Cocaine     (NEGATIVE)  


 


Urine Marijuana (THC)     (NEGATIVE)  














- Progress


Progress: improved


Progress Note: 





11/06/21 00:33


Pt became more awake and alert during her stay. Glucose stable during her stay, 

and pt eating before discharge. It became more apparent that pt was at her 

baseline during her stay, so she was discharged back to [facility]. Pt w chronic 

elevation of troponin which was baseline on serial blood draws. 


11/06/21 00:37


Rings removed w ring cutter by physician from L 3rd digit. No comps.





- Departure


Departure Disposition: Extended Care Facility


Clinical Impression: 


 Hypoglycemia, Elevated troponin





Condition: Stable


Critical Care Time: No


Referrals: 


ERNESTO PORRAS MD [Primary Care Provider] - Follow up/PCP as directed


Instructions:  Low Blood Sugar, Adult (DC)


Additional Instructions: 


Feed pt before she goes to bed


Have nursing home physician see and evaluate pt

## 2021-11-05 NOTE — XRAY
Indication: Leukocytosis.



Comparison: November 1, 2021.



Portable chest limited due to hand overlying right lung base.  Remaining heart

and lungs unremarkable.  Stable bony osteopenia, degenerative changes, and

left humeral fracture

## 2021-11-09 ENCOUNTER — HOSPITAL ENCOUNTER (EMERGENCY)
Dept: HOSPITAL 33 - ED | Age: 76
LOS: 1 days | Discharge: NURSING, REHAB OR LONG-TERM CARE | End: 2021-11-10
Payer: MEDICARE

## 2021-11-09 DIAGNOSIS — E16.2: Primary | ICD-10-CM

## 2021-11-09 DIAGNOSIS — Z79.899: ICD-10-CM

## 2021-11-09 DIAGNOSIS — N39.0: ICD-10-CM

## 2021-11-09 LAB
ANION GAP SERPL CALC-SCNC: 12.7 MEQ/L (ref 5–15)
BASOPHILS # BLD AUTO: 0.03 10*3/UL (ref 0–0.4)
BASOPHILS NFR BLD AUTO: 0.3 % (ref 0–0.4)
BUN SERPL-MCNC: 18 MG/DL (ref 7–17)
CALCIUM SPEC-MCNC: 8.7 MG/DL (ref 8.4–10.2)
CHLORIDE SERPL-SCNC: 98 MMOL/L (ref 98–107)
CO2 SERPL-SCNC: 33 MMOL/L (ref 22–30)
CREAT SERPL-MCNC: 0.52 MG/DL (ref 0.52–1.04)
EOSINOPHIL # BLD AUTO: 0.01 10*3/UL (ref 0–0.5)
GFR SERPLBLD BASED ON 1.73 SQ M-ARVRAT: > 60 ML/MIN
GLUCOSE SERPL-MCNC: 78 MG/DL (ref 74–106)
GLUCOSE UR-MCNC: 150 MG/DL
HCT VFR BLD AUTO: 36.6 % (ref 35–47)
HGB BLD-MCNC: 11.3 GM/DL (ref 12–16)
LYMPHOCYTES # SPEC AUTO: 1.08 10*3/UL (ref 1–4.6)
MCH RBC QN AUTO: 29.8 PG (ref 26–32)
MCHC RBC AUTO-ENTMCNC: 30.9 G/DL (ref 32–36)
MONOCYTES # BLD AUTO: 0.65 10*3/UL (ref 0–1.3)
PLATELET # BLD AUTO: 247 K/MM3 (ref 150–450)
POTASSIUM SERPLBLD-SCNC: 4.2 MMOL/L (ref 3.5–5.1)
PROT UR STRIP-MCNC: NEGATIVE MG/DL
RBC # BLD AUTO: 3.79 M/MM3 (ref 4.1–5.4)
RBC #/AREA URNS HPF: >101 /HPF (ref 0–2)
SODIUM SERPL-SCNC: 139 MMOL/L (ref 137–145)
WBC # BLD AUTO: 10.6 K/MM3 (ref 4–10.5)
WBC #/AREA URNS HPF: >100 /HPF (ref 0–5)

## 2021-11-09 PROCEDURE — 87086 URINE CULTURE/COLONY COUNT: CPT

## 2021-11-09 PROCEDURE — 82947 ASSAY GLUCOSE BLOOD QUANT: CPT

## 2021-11-09 PROCEDURE — 80048 BASIC METABOLIC PNL TOTAL CA: CPT

## 2021-11-09 PROCEDURE — 99284 EMERGENCY DEPT VISIT MOD MDM: CPT

## 2021-11-09 PROCEDURE — 85025 COMPLETE CBC W/AUTO DIFF WBC: CPT

## 2021-11-09 PROCEDURE — 36415 COLL VENOUS BLD VENIPUNCTURE: CPT

## 2021-11-09 PROCEDURE — 96375 TX/PRO/DX INJ NEW DRUG ADDON: CPT

## 2021-11-09 PROCEDURE — 87651 STREP A DNA AMP PROBE: CPT

## 2021-11-09 PROCEDURE — 36000 PLACE NEEDLE IN VEIN: CPT

## 2021-11-09 PROCEDURE — 96374 THER/PROPH/DIAG INJ IV PUSH: CPT

## 2021-11-09 PROCEDURE — 93041 RHYTHM ECG TRACING: CPT

## 2021-11-09 PROCEDURE — 81001 URINALYSIS AUTO W/SCOPE: CPT

## 2021-11-09 NOTE — ERPHSYRPT
- History of Present Illness


Time Seen by Provider: 11/09/21 20:15


Source: patient


Exam Limitations: clinical condition


Physician History: 





This is a 76-year-old white female resident of [facility].  Is a 

patient of Dr. Porras.  Patient is DNR.  Patient has a history of diabetes, 

Alzheimer dementia, hypertension.  Patient has known history of a fractured h

umerus.  Patient, per nursing home report, has been refusing to eat and has been

refusing to take her medications.  Patient had a similar episode on 11/5/2021.  

She had a CAT scan of her head at that time which shows a nonacute senile brain.

 Patient was given glucagon at the nursing home.  EMS was called after this did 

not raise her blood sugar sufficiently.  EMS arrived and gave oral glucose.  P

atient arrives via EMS moving all her extremities.  Per nurses report, patient 

is arrives at her neurologic baseline given her history of Alzheimer dementia.


Timing/Duration: today


Severity: moderate


Associated Symptoms: denies symptoms


Allergies/Adverse Reactions: 








No Known Drug Allergies Allergy (Verified 11/09/21 20:40)


   





Home Medications: 








Acetaminophen 325 mg*** [Tylenol 325 mg***] 650 mg PO Q4HPRN PRN 10/27/21 

[History]


Aspirin EC 81 mg*** [Ecotrin 81 mg***] 81 mg PO DAILY 10/27/21 [History]


Atorvastatin Calcium 40 mg PO HS 10/27/21 [History]


Clonidine HCl 0.2 mg PO BID 10/27/21 [History]


Donepezil HCl 10 mg*** [Aricept 10 MG***] 10 mg PO HS 10/27/21 [History]


Glucagon 1 mg*** [GlucaGen 1 MG***] 1 mg IM UD 10/27/21 [History]


Hydralazine HCl 10 mg PO TID 10/27/21 [History]


Insulin Lispro [Humalog] 1 units SQ UD 10/27/21 [History]


Lisinopril 40 mg PO DAILY 10/27/21 [History]


Magnesium Hydroxide 30 ml*** [Milk of Magnesia 30 ml***] 30 ml PO DAILY PRN PRN 

10/27/21 [History]


Magnesium Oxide [Mag-Oxide] 200 mg PO DAILY 10/27/21 [History]


Memantine HCl 5 mg PO BID 10/27/21 [History]


Metoprolol Succinate 50 mg*** [Toprol Xl 50 MG***] 50 mg PO BID 10/27/21 

[History]


Multivit,Calc,Mins/Iron/Folic [Therems-M Tablet] 1 tab PO DAILY 10/27/21 

[History]


Nitroglycerin 0.4 mg Tablet*** [Nitrostat 0.4 MG Tablet***] 0.4 mg SL Q5MIN PRN 

MR X 3 PRN 10/27/21 [History]


Ondansetron ODT 4 MG*** [Zofran Odt 4 mg***] 4 mg PO Q6H PRN PRN 10/27/21 

[History]


Sertraline HCl 50 mg** [Zoloft 50 mg Tablet**] 50 mg PO HS 10/27/21 [History]


Vit A/Vit C/Vit E/Zinc/Copper [Preservision Areds Softgel] 1 cap PO DAILY 

10/27/21 [History]


Insulin Glargine** [Lantus Insulin**] 22 unit SQ HS 11/05/21 [History]





Hx Tetanus, Diphtheria Vaccination/Date Given:  (Unkown)


Hx Influenza Vaccination/Date Given:  (Unknown)


Hx Pneumococcal Vaccination/Date Given:  (Unknown)





Travel Risk





- International Travel


Have you traveled outside of the country in past 3 weeks: No





- Coronavirus Screening


Are you exhibiting any of the following symptoms?: No


Close contact with a COVID-19 positive Pt in past 14-21 Days: No





- Vaccine Status


Have you recieved a Covid-19 vaccination: Yes


: Unknown





- Vaccination Dates


Dates if Unknown: 3/1/2021 and 3/30/2021





- Review of Systems


Constitutional: No Symptoms


Eyes: No Symptoms


Ears, Nose, & Throat: No Symptoms


Respiratory: No Symptoms


Cardiac: No Symptoms


Abdominal/Gastrointestinal: No Symptoms


Genitourinary Symptoms: No Symptoms


Musculoskeletal: No Symptoms


Skin: No Symptoms


Neurological: No Symptoms


Psychological: No Symptoms


Endocrine: No Symptoms


Hematologic/Lymphatic: No Symptoms


Immunological/Allergic: No Symptoms


All Other Systems: Reviewed and Negative





- Past Medical History


Pertinent Past Medical History: Yes


Neurological History: Alzheimer's Disease, Dementia, Stroke


ENT History: Cataracts


Cardiac History: High Cholesterol, Hypertension


Respiratory History: CHF


Endocrine Medical History: Diabetes Type I


Musculoskeletal History: Osteoarthritis


GI Medical History: No Pertinent History


 History: No Pertinent History


Psycho-Social History: Depression


Female Reproductive Disorders: No Pertinent History


Other Medical History: PER [facility] RECORDS





- Past Surgical History


Past Surgical History: No


Neuro Surgical History: No Pertinent History


Cardiac: No Pertinent History


Respiratory: No Pertinent History


Gastrointestinal: No Pertinent History


Genitourinary: No Pertinent History


Musculoskeletal: No Pertinent History


Female Surgical History: No Pertinent History


Other Surgical History: PER [facility] RECORDS





- Social History


Smoking Status: Unknown if ever smoked


Exposure to second hand smoke: No


Drug Use: none


Patient Lives Alone: No


Significant Family History: no pertinent family hx





- Nursing Vital Signs


Nursing Vital Signs: 


                               Initial Vital Signs











Temperature  98.3 F   11/09/21 20:15


 


Respiratory Rate  20   11/09/21 20:15


 


Blood Pressure  191/70   11/09/21 20:15


 


O2 Sat by Pulse Oximetry  98   11/09/21 20:15








                                   Pain Scale











Pain Intensity                 0

















- Physical Exam


General Appearance: no apparent distress, alert, anxiety


Eye Exam: PERRL/EOMI, eyes nml inspection


Ears, Nose, Throat Exam: normal ENT inspection, moist mucous membranes


Neck Exam: normal inspection, non-tender, supple, full range of motion


Respiratory Exam: normal breath sounds, lungs clear, airway intact, No chest te

nderness, No respiratory distress


Cardiovascular Exam: regular rate/rhythm, normal heart sounds, normal peripheral

 pulses


Gastrointestinal/Abdomen Exam: soft, normal bowel sounds, No tenderness


Pelvic Exam: not done


Rectal Exam: not done


Back Exam: normal inspection, normal range of motion, No CVA tenderness, No 

vertebral tenderness


Extremity Exam: pelvis stable, limited range of motion (Left shoulder secondary 

to known fracture.), swelling (Left shoulder left upper arm left forearm with 

ecchymosis)


Neurologic Exam: alert, oriented x 3, cooperative, CNs II-XII nml as tested


Skin Exam: dry, ecchymosis (With swelling as listed above)


Lymphatic Exam: No adenopathy


**SpO2 Interpretation**: normal


SpO2: 98


O2 Delivery: Room Air


Ordered Tests: 


                               Active Orders 24 hr











 Category Date Time Status


 


 Cardiac Monitor STAT Care  11/09/21 20:34 Active


 


 IV Insertion STAT Care  11/09/21 20:33 Active


 


 BMP Stat Lab  11/09/21 21:10 Completed


 


 CBC W DIFF Stat Lab  11/09/21 21:10 Completed


 


 CULTURE,URINE Stat Lab  11/09/21 22:35 Received


 


 POCT GLUCOSE Stat Lab  11/09/21 20:19 Completed


 


 POCT GLUCOSE Stat Lab  11/09/21 20:21 Completed


 


 POCT GLUCOSE Stat Lab  11/09/21 22:24 Completed


 


 UA W/RFX UR CULTURE Stat Lab  11/09/21 22:35 Completed








Medication Summary











Generic Name Dose Route Start Last Admin





  Trade Name Qing  PRN Reason Stop Dose Admin


 


Ceftriaxone Sodium/Dextrose  1 g in 50 mls @ 100 mls/hr  11/09/21 23:57 





  Rocephin 1 Gm-D5w 50 Ml Bag**  IV  11/10/21 00:26 





  STAT STA  














Discontinued Medications














Generic Name Dose Route Start Last Admin





  Trade Name Qing  PRN Reason Stop Dose Admin


 


Dextrose  50 ml  11/09/21 20:33  11/09/21 21:14





  Dextrose 50%-Water 50 Ml Abboject  IV  11/09/21 20:34  50 ml





  STAT ONE   Administration


 


Dextrose  Confirm  11/09/21 21:10 





  Dextrose 50%-Water 50 Ml Abboject  Administered  11/09/21 21:11 





  Dose  





  50 ml  





  IV  





  .STK-MED ONE  


 


Dextrose  25 ml  11/09/21 22:44  11/09/21 22:52





  Dextrose 50%-Water 50 Ml Abboject  IV  11/09/21 22:45  25 ml





  STAT ONE   Administration


 


Dextrose  Confirm  11/09/21 22:50 





  Dextrose 50%-Water 50 Ml Abboject  Administered  11/09/21 22:51 





  Dose  





  50 ml  





  IV  





  .STK-MED ONE  


 


Sodium Chloride  500 mls @ 500 mls/hr  11/09/21 20:34  11/09/21 22:53





  Sodium Chloride 0.9% 500 Ml  IV  11/09/21 21:33  Infused





  .Q1H ONE   Infusion


 


Sodium Chloride  Confirm  11/09/21 21:10 





  Sodium Chloride 0.9% 500 Ml  Administered  11/09/21 21:11 





  Dose  





  500 mls @ ud  





  IV  





  .STK-MED ONE  











Lab/Rad Data: 


                           Laboratory Result Diagrams





                                 11/09/21 21:10 





                                 11/09/21 21:10 





                               Laboratory Results











  11/09/21 11/09/21 11/09/21 Range/Units





  22:35 22:24 21:10 


 


WBC     (4.0-10.5)  K/mm3


 


RBC     (4.1-5.4)  M/mm3


 


Hgb     (12.0-16.0)  gm/dl


 


Hct     (35-47)  %


 


MCV     ()  fl


 


MCH     (26-32)  pg


 


MCHC     (32-36)  g/dl


 


RDW     (11.5-14.0)  %


 


Plt Count     (150-450)  K/mm3


 


MPV     (7.5-11.0)  fl


 


Gran %     (36.0-66.0)  %


 


Eos # (Auto)     (0-0.5)  


 


Absolute Lymphs (auto)     (1.0-4.6)  


 


Absolute Monos (auto)     (0.0-1.3)  


 


Lymphocytes %     (24.0-44.0)  %


 


Monocytes %     (0.0-12.0)  %


 


Eosinophils %     (0.00-5.0)  %


 


Basophils %     (0.0-0.4)  %


 


Absolute Granulocytes     (1.4-6.9)  


 


Basophils #     (0-0.4)  


 


Sodium     (137-145)  mmol/L


 


Potassium     (3.5-5.1)  mmol/L


 


Chloride     ()  mmol/L


 


Carbon Dioxide     (22-30)  mmol/L


 


Anion Gap     (5-15)  MEQ/L


 


BUN     (7-17)  mg/dL


 


Creatinine     (0.52-1.04)  mg/dL


 


Estimated GFR     ML/MIN


 


Glucose     ()  mg/dL


 


POC Glucometer   158 H   (50 to 500)  mg/dL


 


Calcium     (8.4-10.2)  mg/dL


 


Urine Color  YELLOW    (YELLOW)  


 


Urine Appearance  CLOUDY    (CLEAR)  


 


Urine pH  6.0    (5-6)  


 


Ur Specific Gravity  1.013    (1.005-1.025)  


 


Urine Protein  NEGATIVE    (Negative)  


 


Urine Ketones  SMALL    (NEGATIVE)  


 


Urine Blood  NEGATIVE    (0-5)  Jose/ul


 


Urine Nitrite  NEGATIVE    (NEGATIVE)  


 


Urine Bilirubin  NEGATIVE    (NEGATIVE)  


 


Urine Urobilinogen  2    (0-1)  mg/dL


 


Ur Leukocyte Esterase  LARGE    (NEGATIVE)  


 


Urine WBC (Auto)  >100    (0-5)  /HPF


 


Urine RBC (Auto)  >101    (0-2)  /HPF


 


U Hyaline Cast (Auto)  6-10    (0-2)  /LPF


 


U Epithel Cells (Auto)  RARE    (FEW)  /HPF


 


Urine Bacteria (Auto)  FEW    (NEGATIVE)  /HPF


 


Urine Yeast (Budding)  Many    (NEGATIVE)  /HPF


 


Urine Culture Reflexed  ORDERED SEPARATELY    (NO)  


 


Urine Glucose  150    (NEGATIVE)  mg/dL


 


Group A Strep Antibody    NOT DETECTED  (NEGATIVE)  














  11/09/21 11/09/21 11/09/21 Range/Units





  21:10 21:10 20:21 


 


WBC   10.6 H   (4.0-10.5)  K/mm3


 


RBC   3.79 L   (4.1-5.4)  M/mm3


 


Hgb   11.3 L   (12.0-16.0)  gm/dl


 


Hct   36.6   (35-47)  %


 


MCV   96.6   ()  fl


 


MCH   29.8   (26-32)  pg


 


MCHC   30.9 L   (32-36)  g/dl


 


RDW   16.0 H   (11.5-14.0)  %


 


Plt Count   247   (150-450)  K/mm3


 


MPV   11.9 H   (7.5-11.0)  fl


 


Gran %   83.3 H   (36.0-66.0)  %


 


Eos # (Auto)   0.01   (0-0.5)  


 


Absolute Lymphs (auto)   1.08   (1.0-4.6)  


 


Absolute Monos (auto)   0.65   (0.0-1.3)  


 


Lymphocytes %   10.2 L   (24.0-44.0)  %


 


Monocytes %   6.1   (0.0-12.0)  %


 


Eosinophils %   0.1   (0.00-5.0)  %


 


Basophils %   0.3   (0.0-0.4)  %


 


Absolute Granulocytes   8.82 H   (1.4-6.9)  


 


Basophils #   0.03   (0-0.4)  


 


Sodium  139    (137-145)  mmol/L


 


Potassium  4.2    (3.5-5.1)  mmol/L


 


Chloride  98    ()  mmol/L


 


Carbon Dioxide  33 H    (22-30)  mmol/L


 


Anion Gap  12.7    (5-15)  MEQ/L


 


BUN  18 H    (7-17)  mg/dL


 


Creatinine  0.52    (0.52-1.04)  mg/dL


 


Estimated GFR  > 60.0    ML/MIN


 


Glucose  78    ()  mg/dL


 


POC Glucometer    30 L*  (50 to 500)  mg/dL


 


Calcium  8.7    (8.4-10.2)  mg/dL


 


Urine Color     (YELLOW)  


 


Urine Appearance     (CLEAR)  


 


Urine pH     (5-6)  


 


Ur Specific Gravity     (1.005-1.025)  


 


Urine Protein     (Negative)  


 


Urine Ketones     (NEGATIVE)  


 


Urine Blood     (0-5)  Jose/ul


 


Urine Nitrite     (NEGATIVE)  


 


Urine Bilirubin     (NEGATIVE)  


 


Urine Urobilinogen     (0-1)  mg/dL


 


Ur Leukocyte Esterase     (NEGATIVE)  


 


Urine WBC (Auto)     (0-5)  /HPF


 


Urine RBC (Auto)     (0-2)  /HPF


 


U Hyaline Cast (Auto)     (0-2)  /LPF


 


U Epithel Cells (Auto)     (FEW)  /HPF


 


Urine Bacteria (Auto)     (NEGATIVE)  /HPF


 


Urine Yeast (Budding)     (NEGATIVE)  /HPF


 


Urine Culture Reflexed     (NO)  


 


Urine Glucose     (NEGATIVE)  mg/dL


 


Group A Strep Antibody     (NEGATIVE)  














  11/09/21 Range/Units





  20:19 


 


WBC   (4.0-10.5)  K/mm3


 


RBC   (4.1-5.4)  M/mm3


 


Hgb   (12.0-16.0)  gm/dl


 


Hct   (35-47)  %


 


MCV   ()  fl


 


MCH   (26-32)  pg


 


MCHC   (32-36)  g/dl


 


RDW   (11.5-14.0)  %


 


Plt Count   (150-450)  K/mm3


 


MPV   (7.5-11.0)  fl


 


Gran %   (36.0-66.0)  %


 


Eos # (Auto)   (0-0.5)  


 


Absolute Lymphs (auto)   (1.0-4.6)  


 


Absolute Monos (auto)   (0.0-1.3)  


 


Lymphocytes %   (24.0-44.0)  %


 


Monocytes %   (0.0-12.0)  %


 


Eosinophils %   (0.00-5.0)  %


 


Basophils %   (0.0-0.4)  %


 


Absolute Granulocytes   (1.4-6.9)  


 


Basophils #   (0-0.4)  


 


Sodium   (137-145)  mmol/L


 


Potassium   (3.5-5.1)  mmol/L


 


Chloride   ()  mmol/L


 


Carbon Dioxide   (22-30)  mmol/L


 


Anion Gap   (5-15)  MEQ/L


 


BUN   (7-17)  mg/dL


 


Creatinine   (0.52-1.04)  mg/dL


 


Estimated GFR   ML/MIN


 


Glucose   ()  mg/dL


 


POC Glucometer  21 L*  (50 to 500)  mg/dL


 


Calcium   (8.4-10.2)  mg/dL


 


Urine Color   (YELLOW)  


 


Urine Appearance   (CLEAR)  


 


Urine pH   (5-6)  


 


Ur Specific Gravity   (1.005-1.025)  


 


Urine Protein   (Negative)  


 


Urine Ketones   (NEGATIVE)  


 


Urine Blood   (0-5)  Jose/ul


 


Urine Nitrite   (NEGATIVE)  


 


Urine Bilirubin   (NEGATIVE)  


 


Urine Urobilinogen   (0-1)  mg/dL


 


Ur Leukocyte Esterase   (NEGATIVE)  


 


Urine WBC (Auto)   (0-5)  /HPF


 


Urine RBC (Auto)   (0-2)  /HPF


 


U Hyaline Cast (Auto)   (0-2)  /LPF


 


U Epithel Cells (Auto)   (FEW)  /HPF


 


Urine Bacteria (Auto)   (NEGATIVE)  /HPF


 


Urine Yeast (Budding)   (NEGATIVE)  /HPF


 


Urine Culture Reflexed   (NO)  


 


Urine Glucose   (NEGATIVE)  mg/dL


 


Group A Strep Antibody   (NEGATIVE)  














- Progress


Progress: improved, re-examined


Counseled pt/family regarding: lab results, diagnosis





- Departure


Departure Disposition: Home


Clinical Impression: 


 Hypoglycemia, UTI (urinary tract infection)





Condition: Stable


Critical Care Time: No


Referrals: 


ERNESTO PORRAS MD [Primary Care Provider] - Follow up/PCP as directed


Additional Instructions: 


Give plenty of fluids orally.  Contacted the patient's provider for possible 

dextrose/normal saline infusion.  Give antibiotics as prescribed.  Encourage 

oral intake.  Hold insulin.


Prescriptions: 


Ciprofloxacin [Cipro 500 MG***] 500 mg PO BID #14 tablet

## 2021-11-11 NOTE — PCM.HP
History of Present Illness





- Chief Complaint


Chief Complaint: HYPEROSMOLAR HYPERGLYCEMIC STATE


Date: 10/28/21


History of Present Illness: 


 is a 76 year old female.  Pt. presented to ER with n/v, decreased sense

of awareness and abdominal pain, work-up showed HHS, pt. admitted for critical 

care treatment.








- Review of Systems


Constitutional: No Fever, No Chills


Eyes: No Symptoms


Ears, Nose, & Throat: No Symptoms


Respiratory: No Cough, No Short Of Breath


Cardiac: No Chest Pain, No Edema, No Syncope


Abdominal/Gastrointestinal: Abdominal Pain, Nausea, Vomiting, No Diarrhea


Genitourinary Symptoms: No Dysuria


Musculoskeletal: Arthralgias, No Back Pain, No Neck Pain


Skin: No Rash


Neurological: Lethargy, No Dizziness, No Focal Weakness, No Sensory Changes


Psychological: No Symptoms


Endocrine: No Symptoms


Hematologic/Lymphatic: No Symptoms


Immunological/Allergic: No Symptoms





Medications & Allergies


Home Medications: 


                              Home Medication List





Acetaminophen 325 mg*** [Tylenol 325 mg***] 650 mg PO Q4HPRN PRN 10/27/21 

[History Confirmed 11/05/21]


Aspirin EC 81 mg*** [Ecotrin 81 mg***] 81 mg PO DAILY 10/27/21 [History 

Confirmed 11/05/21]


Atorvastatin Calcium 40 mg PO HS 10/27/21 [History Confirmed 11/05/21]


Clonidine HCl 0.2 mg PO BID 10/27/21 [History Confirmed 11/05/21]


Donepezil HCl 10 mg*** [Aricept 10 MG***] 10 mg PO HS 10/27/21 [History 

Confirmed 11/05/21]


Glucagon 1 mg*** [GlucaGen 1 MG***] 1 mg IM UD 10/27/21 [History Confirmed 

11/05/21]


Hydralazine HCl 10 mg PO TID 10/27/21 [History Confirmed 11/05/21]


Insulin Lispro [Humalog] 1 units SQ UD 10/27/21 [History Confirmed 11/05/21]


Lisinopril 40 mg PO DAILY 10/27/21 [History Confirmed 11/05/21]


Magnesium Hydroxide 30 ml*** [Milk of Magnesia 30 ml***] 30 ml PO DAILY PRN PRN 

10/27/21 [History Confirmed 11/05/21]


Magnesium Oxide [Mag-Oxide] 200 mg PO DAILY 10/27/21 [History Confirmed 11/05/ 21]


Memantine HCl 5 mg PO BID 10/27/21 [History Confirmed 11/05/21]


Metoprolol Succinate 50 mg*** [Toprol Xl 50 MG***] 50 mg PO BID 10/27/21 

[History Confirmed 11/05/21]


Multivit,Calc,Mins/Iron/Folic [Therems-M Tablet] 1 tab PO DAILY 10/27/21 

[History Confirmed 11/05/21]


Nitroglycerin 0.4 mg Tablet*** [Nitrostat 0.4 MG Tablet***] 0.4 mg SL Q5MIN PRN 

MR X 3 PRN 10/27/21 [History Confirmed 11/05/21]


Ondansetron ODT 4 MG*** [Zofran Odt 4 mg***] 4 mg PO Q6H PRN PRN 10/27/21 

[History Confirmed 11/05/21]


Sertraline HCl 50 mg** [Zoloft 50 mg Tablet**] 50 mg PO HS 10/27/21 [History 

Confirmed 11/05/21]


Vit A/Vit C/Vit E/Zinc/Copper [Preservision Areds Softgel] 1 cap PO DAILY 

10/27/21 [History Confirmed 11/05/21]


Insulin Glargine** [Lantus Insulin**] 22 unit SQ HS 11/05/21 [History Confirmed 

11/05/21]


Ciprofloxacin [Cipro 500 MG***] 500 mg PO BID #14 tablet 11/09/21 [Rx]








Allergies/Adverse Reactions: 


                                    Allergies











Allergy/AdvReac Type Severity Reaction Status Date / Time


 


No Known Drug Allergies Allergy   Verified 11/09/21 20:40














- Past Medical History


Past Medical History: Yes


Neurological History: Alzheimer's Disease, Dementia, Stroke


ENT History: Cataracts


Cardiac History: High Cholesterol, Hypertension


Respiratory History: CHF


Endocrine Medical History: Diabetes Type I


Musculoskelatal History: Osteoarthritis


GI Medical History: No Pertinent History


 History: No Pertinent History


Pyscho-Social History: Depression


Reproductive Disorders: No Pertinent History


Comment: PER [facility] RECORDS





- Female History


Hx Last Menstrual Period: POST


Are you pregnant now?: No





- Past Surgical History


Past Surgical History: No


Neuro Surgical History: No Pertinent History


Cardiac History: No Pertinent History


Respiratory Surgery: No Pertinent History


GI Surgical History: No Pertinent History


Genitourinary Surgical Hx: No Pertinent History


Musculskeletal Surgical Hx: No Pertinent History


Female Surgical History: No Pertinent History


Other Surgical History: PER [facility] RECORDS





- Social History


Smoking Status: Unknown if ever smoked


Exposure to second hand smoke: No


Alcohol: None


Drug Use: none





- Physical Exam


General Appearance: no apparent distress, lethargy


Neurologic Exam: cooperative, disoriented


Eye Exam: eyes nml inspection


Ears, Nose, Throat Exam: normal ENT inspection, moist mucous membranes


Neck Exam: normal inspection, non-tender, supple


Respiratory Exam: normal breath sounds, lungs clear, No chest tenderness


Cardiovascular Exam: regular rate/rhythm, normal heart sounds


Gastrointestinal/Abdomen Exam: soft, normal bowel sounds, No tenderness, No 

distention, No mass, No guarding, No rebound


Pelvic Exam: not done


Rectal Exam: deferred


Extremity Exam: normal inspection


Skin Exam: normal color, warm, dry, No rash, No petechiae





Results





- Labs


Lab/Micro Results: 


                                  Microbiology











 10/27/21 11:53 Urine Culture - Final





 Catherized    NO GROWTH


 


 10/27/21 06:13 Urine Culture - Final





 Urine, Catheterized    NO GROWTH














Assessment/Plan


(1) Hyperosmolar hyperglycemic coma due to diabetes mellitus without ketoacid

osis


Status: Acute   


Assessment & Plan: 


ICU admission for emergent intense treatment of her condition.


Code(s): E11.01 - TYPE 2 DIABETES MELLITUS WITH HYPEROSMOLARITY WITH COMA

## 2021-11-14 NOTE — PCM.DS
Discharge Summary


Date of Admission: 


10/27/21 08:43





Date of Discharge: 





11/1/2021


Admitting Physician: 


ERNESTO PORRAS





Primary Care Provider: 


ERNESTO PORRAS








Allergies


Allergies





No Known Drug Allergies Allergy (Verified 11/09/21 20:40)


   











Hospital Summary





- Hospital Course


Hospital Course: 


Patient is a resident at [facility] brought to ER by EMS for AMS,persistant N/V and 

abdominal pain. She was dg Hyperosmolar Hyperglycemic state with elevated Lactic

acid. ,treated in critical care on insulin drip.When at baseline,patient was 

discharged back to [facility].





- Vitals & Intake/Output


Vital Signs: 





                                   Vital Signs











Temperature  97.6 F   11/01/21 07:41


 


Pulse Rate  79   11/01/21 07:41


 


Respiratory Rate  20   11/01/21 07:41


 


Blood Pressure  136/92   11/01/21 07:41


 


O2 Sat by Pulse Oximetry  96   11/01/21 07:41














- Lab


Result Diagrams: 


                                 11/01/21 09:04





                                 11/01/21 09:04


Micro Results-Entire Visit: 





                                  Microbiology











 10/27/21 11:53 Urine Culture - Final





 Catherized    NO GROWTH


 


 10/27/21 06:13 Urine Culture - Final





 Urine, Catheterized    NO GROWTH














- Procedures and Test


Procedures and Tests throughout Hospitalization: 





                            Therapy Orders & Screens





10/27/21 13:12


Respiratory Therapy Assessment DAILY 


   Comment: 


   Diagnosis: HYPEROSMOLAR HYPERGLYCEMIC STATE





10/27/21 19:16


Oxygen Nasal Cannula 2 lpm 


   Comment: PER ORDER KEEP SATS ABOVE 95%


   Diagnosis: HYPEROSMOLAR HYPERGLYCEMIC STATE





10/28/21 09:16


ST Eval & Treat (MD Order) .as ordered 


   Comment: 


   Physician Instructions: 


   Reason For Exam: MD ORDER FOR SWALLOW EVAL


   Evaluate: Yes


   Treat: Yes


   Reason for Eval: DIFFICULTY SWALLOWING


   Diagnosis: HYPEROSMOLAR HYPERGLYCEMIC STATE





10/29/21 09:06


Speech Therapy Eval & Treat [ST Eval & Treat (MD Order)] .as ordered 


   Comment: 


   Physician Instructions: 


   Reason For Exam: 


   Evaluate: swallowing difficulty


   Treat: Yes


   Reason for Eval: regarding patient oral meds


   Diagnosis: HYPEROSMOLAR HYPERGLYCEMIC STATE














Discharge Exam


General Appearance: other (see exam by Dr Guy 10/31/21)





Final Diagnosis/Problem List





- Final Discharge Diagnosis/Problem


(1) Hyperosmolar hyperglycemic state (HHS)


Status: Resolved   Code(s): E11.00 - TYPE 2 DIAB W HYPROSM W/O NONKET 

HYPRGLY-HYPROS COMA ([facility]); E11.65 - TYPE 2 DIABETES MELLITUS WITH 

HYPERGLYCEMIA   





(2) Lactic acidosis


Status: Resolved   Code(s): E87.2 - ACIDOSIS   





(3) Nausea & vomiting


Status: Resolved   Code(s): R11.2 - NAUSEA WITH VOMITING, UNSPECIFIED   





(4) Abdominal pain


Status: Resolved   Code(s): R10.9 - UNSPECIFIED ABDOMINAL PAIN   





- Discharge


Disposition: DC TO ANY "OTHER" NURSING HOME


Condition: Stable


Prescriptions: 


Continue


   Lisinopril 40 mg PO DAILY


   Magnesium Oxide [Mag-Oxide] 200 mg PO DAILY


   Memantine HCl 5 mg PO BID


   Multivit,Calc,Mins/Iron/Folic [Therems-M Tablet] 1 tab PO DAILY


   Metoprolol Succinate 50 mg*** [Toprol Xl 50 MG***] 50 mg PO BID


   Vit A/Vit C/Vit E/Zinc/Copper [Preservision Areds Softgel] 1 cap PO DAILY


   Sertraline HCl 50 mg** [Zoloft 50 mg Tablet**] 50 mg PO HS


   Insulin Lispro [Humalog] 1 units SQ UD


   Hydralazine HCl 10 mg PO TID


   Donepezil HCl 10 mg*** [Aricept 10 MG***] 10 mg PO HS


   Clonidine HCl 0.2 mg PO BID


   Atorvastatin Calcium 40 mg PO HS


   Aspirin EC 81 mg*** [Ecotrin 81 mg***] 81 mg PO DAILY


   Nitroglycerin 0.4 mg Tablet*** [Nitrostat 0.4 MG Tablet***] 0.4 mg SL Q5MIN 

PRN MR X 3 PRN


     PRN Reason: Chest Pain


   Acetaminophen 325 mg*** [Tylenol 325 mg***] 650 mg PO Q4HPRN PRN


     PRN Reason: Pain And/Or Fever


   Ondansetron ODT 4 MG*** [Zofran Odt 4 mg***] 4 mg PO Q6H PRN PRN


     PRN Reason: Nausea


   Magnesium Hydroxide 30 ml*** [Milk of Magnesia 30 ml***] 30 ml PO DAILY PRN 

PRN


     PRN Reason: Constipation


   Glucagon 1 mg*** [GlucaGen 1 MG***] 1 mg IM UD





Discontinued


   Insulin Glargine** [Lantus Insulin**] 6 units SQ HS


   Potassium Chloride 10 Meq Tab* [Klor Con 10 MEQ***] 10 meq PO DAILY





No Action


   Insulin Glargine** [Lantus Insulin**] 22 unit SQ HS


   Ciprofloxacin [Cipro 500 MG***] 500 mg PO BID #14 tablet


Additional Instructions: 


[facility] ORDERS:


-RESUME PREVIOUS ORDERS


BMP IN 1 WEEK


-SEE ATTACHED MEDICATION LIST FOR MEDICATION ORDERS. 


Follow up with: 


ERNESTO PORRAS MD [Primary Care Provider] -

## 2021-11-16 ENCOUNTER — HOSPITAL ENCOUNTER (INPATIENT)
Dept: HOSPITAL 33 - ED | Age: 76
LOS: 1 days | Discharge: NURSING, REHAB OR LONG-TERM CARE | DRG: 637 | End: 2021-11-17
Attending: INTERNAL MEDICINE | Admitting: INTERNAL MEDICINE
Payer: MEDICARE

## 2021-11-16 DIAGNOSIS — A41.9: ICD-10-CM

## 2021-11-16 DIAGNOSIS — I10: ICD-10-CM

## 2021-11-16 DIAGNOSIS — Z20.828: ICD-10-CM

## 2021-11-16 DIAGNOSIS — E78.00: ICD-10-CM

## 2021-11-16 DIAGNOSIS — Z79.899: ICD-10-CM

## 2021-11-16 DIAGNOSIS — E87.5: ICD-10-CM

## 2021-11-16 DIAGNOSIS — N17.9: ICD-10-CM

## 2021-11-16 DIAGNOSIS — F02.80: ICD-10-CM

## 2021-11-16 DIAGNOSIS — N39.0: ICD-10-CM

## 2021-11-16 DIAGNOSIS — E11.01: Primary | ICD-10-CM

## 2021-11-16 DIAGNOSIS — G30.9: ICD-10-CM

## 2021-11-16 DIAGNOSIS — R41.82: ICD-10-CM

## 2021-11-16 LAB
ALBUMIN SERPL-MCNC: 3.3 G/DL (ref 3.5–5)
ALP SERPL-CCNC: 230 U/L (ref 38–126)
ALT SERPL-CCNC: 37 U/L (ref 0–35)
ANION GAP SERPL CALC-SCNC: 18.8 MEQ/L (ref 5–15)
ANION GAP SERPL CALC-SCNC: 38.2 MEQ/L (ref 5–15)
AST SERPL QL: 51 U/L (ref 14–36)
BASOPHILS # BLD AUTO: 0.01 10*3/UL (ref 0–0.4)
BASOPHILS NFR BLD AUTO: 0.1 % (ref 0–0.4)
BILIRUB BLD-MCNC: 1.1 MG/DL (ref 0.2–1.3)
BUN SERPL-MCNC: 23 MG/DL (ref 7–17)
BUN SERPL-MCNC: 27 MG/DL (ref 7–17)
CALCIUM SPEC-MCNC: 7.1 MG/DL (ref 8.4–10.2)
CALCIUM SPEC-MCNC: 8.5 MG/DL (ref 8.4–10.2)
CHLORIDE SERPL-SCNC: 105 MMOL/L (ref 98–107)
CHLORIDE SERPL-SCNC: 95 MMOL/L (ref 98–107)
CO2 SERPL-SCNC: 20 MMOL/L (ref 22–30)
CO2 SERPL-SCNC: 9 MMOL/L (ref 22–30)
CREAT SERPL-MCNC: 1.61 MG/DL (ref 0.52–1.04)
CREAT SERPL-MCNC: 1.62 MG/DL (ref 0.52–1.04)
EOSINOPHIL # BLD AUTO: 0.01 10*3/UL (ref 0–0.5)
FLUAV AG NPH QL IA: NEGATIVE
FLUBV AG NPH QL IA: NEGATIVE
GFR SERPLBLD BASED ON 1.73 SQ M-ARVRAT: 32.7 ML/MIN
GFR SERPLBLD BASED ON 1.73 SQ M-ARVRAT: 32.9 ML/MIN
GLUCOSE SERPL-MCNC: 237 MG/DL (ref 74–106)
GLUCOSE SERPL-MCNC: 628 MG/DL (ref 74–106)
GLUCOSE UR-MCNC: >=500 MG/DL
HCT VFR BLD AUTO: 38.4 % (ref 35–47)
HGB BLD-MCNC: 11.3 GM/DL (ref 12–16)
LYMPHOCYTES # SPEC AUTO: 1.88 10*3/UL (ref 1–4.6)
MAGNESIUM SERPL-MCNC: 1.8 MG/DL (ref 1.6–2.3)
MCH RBC QN AUTO: 30.5 PG (ref 26–32)
MCHC RBC AUTO-ENTMCNC: 29.4 G/DL (ref 32–36)
MONOCYTES # BLD AUTO: 0.22 10*3/UL (ref 0–1.3)
PLATELET # BLD AUTO: 197 K/MM3 (ref 150–450)
POTASSIUM SERPLBLD-SCNC: 4.1 MMOL/L (ref 3.5–5.1)
POTASSIUM SERPLBLD-SCNC: 6.3 MMOL/L (ref 3.5–5.1)
PROT SERPL-MCNC: 5.9 G/DL (ref 6.3–8.2)
PROT UR STRIP-MCNC: 30 MG/DL
RBC # BLD AUTO: 3.7 M/MM3 (ref 4.1–5.4)
RBC #/AREA URNS HPF: >101 /HPF (ref 0–2)
RSV AG SPEC QL IA: NEGATIVE
SARS-COV-2 AG RESP QL IA.RAPID: NEGATIVE
SODIUM SERPL-SCNC: 136 MMOL/L (ref 137–145)
SODIUM SERPL-SCNC: 140 MMOL/L (ref 137–145)
WBC # BLD AUTO: 9.5 K/MM3 (ref 4–10.5)
WBC #/AREA URNS HPF: >100 /HPF (ref 0–5)

## 2021-11-16 PROCEDURE — 84134 ASSAY OF PREALBUMIN: CPT

## 2021-11-16 PROCEDURE — 96360 HYDRATION IV INFUSION INIT: CPT

## 2021-11-16 PROCEDURE — 36600 WITHDRAWAL OF ARTERIAL BLOOD: CPT

## 2021-11-16 PROCEDURE — 94760 N-INVAS EAR/PLS OXIMETRY 1: CPT

## 2021-11-16 PROCEDURE — 83735 ASSAY OF MAGNESIUM: CPT

## 2021-11-16 PROCEDURE — 96376 TX/PRO/DX INJ SAME DRUG ADON: CPT

## 2021-11-16 PROCEDURE — 96375 TX/PRO/DX INJ NEW DRUG ADDON: CPT

## 2021-11-16 PROCEDURE — 51702 INSERT TEMP BLADDER CATH: CPT

## 2021-11-16 PROCEDURE — 0241U: CPT

## 2021-11-16 PROCEDURE — 96361 HYDRATE IV INFUSION ADD-ON: CPT

## 2021-11-16 PROCEDURE — 80048 BASIC METABOLIC PNL TOTAL CA: CPT

## 2021-11-16 PROCEDURE — 80076 HEPATIC FUNCTION PANEL: CPT

## 2021-11-16 PROCEDURE — 81001 URINALYSIS AUTO W/SCOPE: CPT

## 2021-11-16 PROCEDURE — 96374 THER/PROPH/DIAG INJ IV PUSH: CPT

## 2021-11-16 PROCEDURE — 82947 ASSAY GLUCOSE BLOOD QUANT: CPT

## 2021-11-16 PROCEDURE — 80053 COMPREHEN METABOLIC PANEL: CPT

## 2021-11-16 PROCEDURE — 85025 COMPLETE CBC W/AUTO DIFF WBC: CPT

## 2021-11-16 PROCEDURE — 36415 COLL VENOUS BLD VENIPUNCTURE: CPT

## 2021-11-16 PROCEDURE — 99285 EMERGENCY DEPT VISIT HI MDM: CPT

## 2021-11-16 PROCEDURE — 85027 COMPLETE CBC AUTOMATED: CPT

## 2021-11-16 PROCEDURE — 82375 ASSAY CARBOXYHB QUANT: CPT

## 2021-11-16 PROCEDURE — 94762 N-INVAS EAR/PLS OXIMTRY CONT: CPT

## 2021-11-16 PROCEDURE — 87086 URINE CULTURE/COLONY COUNT: CPT

## 2021-11-16 PROCEDURE — 83605 ASSAY OF LACTIC ACID: CPT

## 2021-11-16 PROCEDURE — 82803 BLOOD GASES ANY COMBINATION: CPT

## 2021-11-16 NOTE — ERPHSYRPT
- History of Present Illness


Time Seen by Provider: 11/16/21 13:03


Source: EMS, nursing home records, old records


Exam Limitations: clinical condition


Physician History: 





This is a 76-year-old diabetic white female with history of hypertension, 

insulin-dependent diabetes and Alzheimer's dementia and is a resident of 

[facility] and presents with hyperglycemia of over 500.  Patient 

is a patient Dr. Porras.  Patient is DNR.  In the last few weeks she has been to 

the emergency department several times because of poorly controlled blood sugar.

 The last visit, a week ago, her blood sugar was measuring 20-30 on an Accu-Chek

at the nursing home.  Patient is also being treated with Cipro for a urinary 

tract infection.  The cultures were evaluated and the bacteria from that culture

is sensitive to Cipro.  Patient was brought to the emergency department by EMS 

service.


Timing/Duration: today


Severity: moderate


Associated Symptoms: denies symptoms


Allergies/Adverse Reactions: 








No Known Drug Allergies Allergy (Verified 11/09/21 20:40)


   





Home Medications: 








Acetaminophen 325 mg*** [Tylenol 325 mg***] 650 mg PO Q4HPRN PRN 10/27/21 

[History]


Aspirin EC 81 mg*** [Ecotrin 81 mg***] 81 mg PO DAILY 10/27/21 [History]


Atorvastatin Calcium 40 mg PO HS 10/27/21 [History]


Clonidine HCl 0.2 mg PO BID 10/27/21 [History]


Donepezil HCl 10 mg*** [Aricept 10 MG***] 10 mg PO HS 10/27/21 [History]


Glucagon 1 mg*** [GlucaGen 1 MG***] 1 mg IM UD 10/27/21 [History]


Hydralazine HCl 10 mg PO TID 10/27/21 [History]


Insulin Lispro [Humalog] 1 units SQ UD 10/27/21 [History]


Lisinopril 40 mg PO DAILY 10/27/21 [History]


Magnesium Hydroxide 30 ml*** [Milk of Magnesia 30 ml***] 30 ml PO DAILY PRN PRN 

10/27/21 [History]


Magnesium Oxide [Mag-Oxide] 200 mg PO DAILY 10/27/21 [History]


Memantine HCl 5 mg PO BID 10/27/21 [History]


Metoprolol Succinate 50 mg*** [Toprol Xl 50 MG***] 50 mg PO BID 10/27/21 

[History]


Multivit,Calc,Mins/Iron/Folic [Therems-M Tablet] 1 tab PO DAILY 10/27/21 

[History]


Nitroglycerin 0.4 mg Tablet*** [Nitrostat 0.4 MG Tablet***] 0.4 mg SL Q5MIN PRN 

MR X 3 PRN 10/27/21 [History]


Ondansetron ODT 4 MG*** [Zofran Odt 4 mg***] 4 mg PO Q6H PRN PRN 10/27/21 

[History]


Sertraline HCl 50 mg** [Zoloft 50 mg Tablet**] 50 mg PO HS 10/27/21 [History]


Vit A/Vit C/Vit E/Zinc/Copper [Preservision Areds Softgel] 1 cap PO DAILY 10/

27/21 [History]


Insulin Glargine** [Lantus Insulin**] 22 unit SQ HS 11/05/21 [History]





Hx Tetanus, Diphtheria Vaccination/Date Given:  (Unkown)


Hx Influenza Vaccination/Date Given:  (Unknown)


Hx Pneumococcal Vaccination/Date Given:  (Unknown)





Travel Risk





- International Travel


Have you traveled outside of the country in past 3 weeks: No





- Coronavirus Screening


Are you exhibiting any of the following symptoms?: No


Close contact with a COVID-19 positive Pt in past 14-21 Days: No





- Vaccine Status


Have you recieved a Covid-19 vaccination: Yes


: Unknown





- Vaccination Dates


Dates if Unknown: 3/1/2021 and 3/30/2021





- Review of Systems


Constitutional: Other


Eyes: No Symptoms


Ears, Nose, & Throat: No Symptoms


Respiratory: No Symptoms


Cardiac: No Symptoms


Abdominal/Gastrointestinal: No Symptoms


Genitourinary Symptoms: No Symptoms


Musculoskeletal: No Symptoms


Skin: Other (Diaphoresis)


Neurological: Other (Patient has significant Alzheimer's dementia)


Psychological: Memory Loss (Secondary to significant Alzheimer's dementia)


Endocrine: No Symptoms


Hematologic/Lymphatic: No Symptoms


Immunological/Allergic: No Symptoms


All Other Systems: Reviewed and Negative





- Past Medical History


Pertinent Past Medical History: Yes


Neurological History: Alzheimer's Disease, Dementia, Stroke


ENT History: Cataracts


Cardiac History: High Cholesterol, Hypertension


Respiratory History: CHF


Endocrine Medical History: Diabetes Type I


Musculoskeletal History: Osteoarthritis


GI Medical History: No Pertinent History


 History: No Pertinent History


Psycho-Social History: Depression


Female Reproductive Disorders: No Pertinent History


Other Medical History: PER [facility] RECORDS





- Past Surgical History


Past Surgical History: No


Neuro Surgical History: No Pertinent History


Cardiac: No Pertinent History


Respiratory: No Pertinent History


Gastrointestinal: No Pertinent History


Genitourinary: No Pertinent History


Musculoskeletal: No Pertinent History


Female Surgical History: No Pertinent History


Other Surgical History: PER [facility] RECORDS





- Social History


Smoking Status: Unknown if ever smoked


Exposure to second hand smoke: No


Drug Use: none


Patient Lives Alone: No


Significant Family History: no pertinent family hx





- Nursing Vital Signs


Nursing Vital Signs: 


                               Initial Vital Signs











Temperature  96.6 F   11/16/21 12:56


 


Pulse Rate  95 H  11/16/21 12:56


 


Respiratory Rate  24   11/16/21 12:56


 


Blood Pressure  100/48   11/16/21 12:56


 


O2 Sat by Pulse Oximetry  96   11/16/21 12:56








                                   Pain Scale











Pain Intensity                 0

















- Physical Exam


General Appearance: no apparent distress, alert, anxiety, lethargy


Eye Exam: PERRL/EOMI, eyes nml inspection


Ears, Nose, Throat Exam: normal ENT inspection, moist mucous membranes


Neck Exam: normal inspection, non-tender, supple, full range of motion


Respiratory Exam: normal breath sounds, lungs clear, airway intact, No chest 

tenderness, No respiratory distress


Cardiovascular Exam: regular rate/rhythm, normal heart sounds, normal peripheral

 pulses


Gastrointestinal/Abdomen Exam: soft, normal bowel sounds, No tenderness


Pelvic Exam: not done


Rectal Exam: not done


Back Exam: normal inspection, normal range of motion


Extremity Exam: normal range of motion, pelvis stable, swelling (Bilateral upper

 extremities with associated ecchymosis left side worse than right)


Neurologic Exam: disoriented, confusion, other (Lethargic)


Skin Exam: normal color, warm, dry


Lymphatic Exam: No adenopathy


**SpO2 Interpretation**: normal


O2 Delivery: Room Air





- Course


Nursing assessment & vital signs reviewed: Yes


EKG Interpreted by Me: RATE (76), Sinus Rhythm, Other (There is new evidence of 

borderline ST depression diffusely as well as abnormal T diffusely.)


Ordered Tests: 


                               Active Orders 24 hr











 Category Date Time Status


 


 Kim [Catheter-Manitou Kim] STAT Care  11/16/21 13:16 Active


 


 IV Insertion STAT Care  11/16/21 13:14 Active


 


 Pulse Oximetry (ED) STAT Care  11/16/21 13:14 Active


 


 BMP Stat Lab  11/16/21 20:45 Ordered


 


 CBC W DIFF Stat Lab  11/16/21 13:35 Completed


 


 CMP Stat Lab  11/16/21 13:35 Completed


 


 CULTURE,URINE Stat Lab  11/16/21 14:06 Received


 


 Lactic Acid Routine Lab  11/16/21 20:30 Ordered


 


 Lactic Acid Stat Lab  11/16/21 15:45 Completed


 


 Lactic Acid Urgent Lab  11/16/21 13:40 Completed


 


 MAGNESIUM Stat Lab  11/16/21 13:35 Completed


 


 POCT GLUCOSE Stat Lab  11/16/21 13:21 Completed


 


 POCT GLUCOSE Stat Lab  11/16/21 15:35 Completed


 


 POCT GLUCOSE Stat Lab  11/16/21 19:34 Completed


 


 UA W/RFX UR CULTURE Stat Lab  11/16/21 14:06 Completed








Medication Summary











Generic Name Dose Route Start Last Admin





  Trade Name Freq  PRN Reason Stop Dose Admin


 


Sodium Chloride  1,000 mls @ 100 mls/hr  11/16/21 13:15  11/16/21 14:05





  Sodium Chloride 0.9% 1000 Ml  IV  12/16/21 13:14  100 mls/hr





  .Q10H ALLEN   Administration


 


Sodium Chloride  1,000 mls @ 999 mls/hr  11/16/21 20:09  11/16/21 20:23





  Sodium Chloride 0.9% 1000 Ml  IV  11/16/21 21:09  999 mls/hr





  .Q1H1M STA   Administration














Discontinued Medications














Generic Name Dose Route Start Last Admin





  Trade Name Qing  PRN Reason Stop Dose Admin


 


Ceftriaxone Sodium  1,000 mg  11/16/21 16:20  11/16/21 18:49





  Ceftriaxone Sodium 1000 Mg Inj Vial  IM  11/16/21 16:21  Not Given





  STAT ONE  


 


Ceftriaxone Sodium/Dextrose  Confirm  11/16/21 18:28 





  Rocephin 1 Gm-D5w 50 Ml Bag**  Administered  11/16/21 18:29 





  Dose  





  1 g in 50 mls @ ud  





  IV  





  .STK-MED ONE  


 


Ceftriaxone Sodium/Dextrose  1 g in 50 mls @ 100 mls/hr  11/16/21 18:47  

11/16/21 19:36





  Rocephin 1 Gm-D5w 50 Ml Bag**  IV  11/16/21 19:16  Infused





  STAT STA   Infusion


 


Sodium Chloride  1,000 mls @ 999 mls/hr  11/16/21 19:23  11/16/21 20:19





  Sodium Chloride 0.9% 1000 Ml  IV  11/16/21 20:23  999 mls/hr





  .Q1H1M STA   Administration


 


Insulin Human Regular  15 unit  11/16/21 13:28  11/16/21 14:05





  Insulin Regular, Human 1 Unit  IV  11/16/21 13:29  15 unit





  STAT ONE   Administration


 


Insulin Human Regular  Confirm  11/16/21 13:59 





  Insulin Regular, Human 1 Unit  Administered  11/16/21 14:00 





  Dose  





  15 unit  





  .ROUTE  





  .STK-MED ONE  


 


Insulin Human Regular  20 unit  11/16/21 15:41  11/16/21 18:48





  Insulin Regular, Human 1 Unit  IV  11/16/21 15:42  20 unit





  STAT ONE   Administration


 


Insulin Human Regular  Confirm  11/16/21 18:27 





  Insulin Regular, Human 1 Unit  Administered  11/16/21 18:28 





  Dose  





  20 unit  





  .ROUTE  





  .STK-MED ONE  


 


Ondansetron HCl  Confirm  11/16/21 18:29 





  Ondansetron Hcl 4 Mg/2 Ml Vial  Administered  11/16/21 18:30 





  Dose  





  4 mg  





  .ROUTE  





  .STK-MED ONE  


 


Ondansetron HCl  4 mg  11/16/21 18:50  11/16/21 18:50





  Ondansetron Hcl 4 Mg/2 Ml Vial  IV  11/16/21 18:51  4 mg





  STAT ONE   Administration











Lab/Rad Data: 


                           Laboratory Result Diagrams





                                 11/16/21 13:35 





                                 11/16/21 13:35 





                               Laboratory Results











  11/16/21 11/16/21 11/16/21 Range/Units





  19:34 15:45 15:35 


 


WBC     (4.0-10.5)  K/mm3


 


RBC     (4.1-5.4)  M/mm3


 


Hgb     (12.0-16.0)  gm/dl


 


Hct     (35-47)  %


 


MCV     ()  fl


 


MCH     (26-32)  pg


 


MCHC     (32-36)  g/dl


 


RDW     (11.5-14.0)  %


 


Plt Count     (150-450)  K/mm3


 


MPV     (7.5-11.0)  fl


 


Gran %     (36.0-66.0)  %


 


Eos # (Auto)     (0-0.5)  


 


Absolute Lymphs (auto)     (1.0-4.6)  


 


Absolute Monos (auto)     (0.0-1.3)  


 


Lymphocytes %     (24.0-44.0)  %


 


Monocytes %     (0.0-12.0)  %


 


Eosinophils %     (0.00-5.0)  %


 


Basophils %     (0.0-0.4)  %


 


Absolute Granulocytes     (1.4-6.9)  


 


Basophils #     (0-0.4)  


 


Sodium     (137-145)  mmol/L


 


Potassium     (3.5-5.1)  mmol/L


 


Chloride     ()  mmol/L


 


Carbon Dioxide     (22-30)  mmol/L


 


Anion Gap     (5-15)  MEQ/L


 


BUN     (7-17)  mg/dL


 


Creatinine     (0.52-1.04)  mg/dL


 


Estimated GFR     ML/MIN


 


Glucose     ()  mg/dL


 


POC Glucometer  332 H   525 H*  (50 to 500)  mg/dL


 


Lactic Acid   10.5 H   (0.4-2.0)  


 


Calcium     (8.4-10.2)  mg/dL


 


Magnesium     (1.6-2.3)  mg/dL


 


Total Bilirubin     (0.2-1.3)  mg/dL


 


AST     (14-36)  U/L


 


ALT     (0-35)  U/L


 


Alkaline Phosphatase     ()  U/L


 


Serum Total Protein     (6.3-8.2)  g/dL


 


Albumin     (3.5-5.0)  g/dL


 


Urine Color     (YELLOW)  


 


Urine Appearance     (CLEAR)  


 


Urine pH     (5-6)  


 


Ur Specific Gravity     (1.005-1.025)  


 


Urine Protein     (Negative)  


 


Urine Ketones     (NEGATIVE)  


 


Urine Blood     (0-5)  Jose/ul


 


Urine Nitrite     (NEGATIVE)  


 


Urine Bilirubin     (NEGATIVE)  


 


Urine Urobilinogen     (0-1)  mg/dL


 


Ur Leukocyte Esterase     (NEGATIVE)  


 


Urine WBC (Auto)     (0-5)  /HPF


 


Urine RBC (Auto)     (0-2)  /HPF


 


U Epithel Cells (Auto)     (FEW)  /HPF


 


Urine Bacteria (Auto)     (NEGATIVE)  /HPF


 


U Non-Squamous Epi Cells     (FEW)  /HPF


 


Urine Mucus (Auto)     (NEGATIVE)  /HPF


 


Urine Yeast (Budding)     (NEGATIVE)  /HPF


 


Urine Culture Reflexed     (NO)  


 


Urine Glucose     (NEGATIVE)  mg/dL














  11/16/21 11/16/21 11/16/21 Range/Units





  14:06 13:40 13:35 


 


WBC     (4.0-10.5)  K/mm3


 


RBC     (4.1-5.4)  M/mm3


 


Hgb     (12.0-16.0)  gm/dl


 


Hct     (35-47)  %


 


MCV     ()  fl


 


MCH     (26-32)  pg


 


MCHC     (32-36)  g/dl


 


RDW     (11.5-14.0)  %


 


Plt Count     (150-450)  K/mm3


 


MPV     (7.5-11.0)  fl


 


Gran %     (36.0-66.0)  %


 


Eos # (Auto)     (0-0.5)  


 


Absolute Lymphs (auto)     (1.0-4.6)  


 


Absolute Monos (auto)     (0.0-1.3)  


 


Lymphocytes %     (24.0-44.0)  %


 


Monocytes %     (0.0-12.0)  %


 


Eosinophils %     (0.00-5.0)  %


 


Basophils %     (0.0-0.4)  %


 


Absolute Granulocytes     (1.4-6.9)  


 


Basophils #     (0-0.4)  


 


Sodium    136 L  (137-145)  mmol/L


 


Potassium    6.3 H*  (3.5-5.1)  mmol/L


 


Chloride    95 L  ()  mmol/L


 


Carbon Dioxide    9 L*  (22-30)  mmol/L


 


Anion Gap    38.2 H  (5-15)  MEQ/L


 


BUN    23 H  (7-17)  mg/dL


 


Creatinine    1.61 H  (0.52-1.04)  mg/dL


 


Estimated GFR    32.9  ML/MIN


 


Glucose    628 H*  ()  mg/dL


 


POC Glucometer     (50 to 500)  mg/dL


 


Lactic Acid   15.3 H   (0.4-2.0)  


 


Calcium    8.5  (8.4-10.2)  mg/dL


 


Magnesium    1.8  (1.6-2.3)  mg/dL


 


Total Bilirubin    1.10  (0.2-1.3)  mg/dL


 


AST    51 H  (14-36)  U/L


 


ALT    37 H  (0-35)  U/L


 


Alkaline Phosphatase    230 H  ()  U/L


 


Serum Total Protein    5.9 L  (6.3-8.2)  g/dL


 


Albumin    3.3 L  (3.5-5.0)  g/dL


 


Urine Color  YELLOW    (YELLOW)  


 


Urine Appearance  TURBID    (CLEAR)  


 


Urine pH  5.0    (5-6)  


 


Ur Specific Gravity  1.014    (1.005-1.025)  


 


Urine Protein  30    (Negative)  


 


Urine Ketones  SMALL    (NEGATIVE)  


 


Urine Blood  NEGATIVE    (0-5)  Jose/ul


 


Urine Nitrite  NEGATIVE    (NEGATIVE)  


 


Urine Bilirubin  NEGATIVE    (NEGATIVE)  


 


Urine Urobilinogen  2    (0-1)  mg/dL


 


Ur Leukocyte Esterase  LARGE    (NEGATIVE)  


 


Urine WBC (Auto)  >100    (0-5)  /HPF


 


Urine RBC (Auto)  >101    (0-2)  /HPF


 


U Epithel Cells (Auto)  RARE    (FEW)  /HPF


 


Urine Bacteria (Auto)  FEW    (NEGATIVE)  /HPF


 


U Non-Squamous Epi Cells  RARE    (FEW)  /HPF


 


Urine Mucus (Auto)  SLIGHT    (NEGATIVE)  /HPF


 


Urine Yeast (Budding)  Many    (NEGATIVE)  /HPF


 


Urine Culture Reflexed  YES    (NO)  


 


Urine Glucose  >=500    (NEGATIVE)  mg/dL














  11/16/21 11/16/21 Range/Units





  13:35 13:21 


 


WBC  9.5   (4.0-10.5)  K/mm3


 


RBC  3.70 L   (4.1-5.4)  M/mm3


 


Hgb  11.3 L   (12.0-16.0)  gm/dl


 


Hct  38.4   (35-47)  %


 


MCV  103.8 H   ()  fl


 


MCH  30.5   (26-32)  pg


 


MCHC  29.4 L   (32-36)  g/dl


 


RDW  15.6 H   (11.5-14.0)  %


 


Plt Count  197   (150-450)  K/mm3


 


MPV  12.8 H   (7.5-11.0)  fl


 


Gran %  77.7 H   (36.0-66.0)  %


 


Eos # (Auto)  0.01   (0-0.5)  


 


Absolute Lymphs (auto)  1.88   (1.0-4.6)  


 


Absolute Monos (auto)  0.22   (0.0-1.3)  


 


Lymphocytes %  19.8 L   (24.0-44.0)  %


 


Monocytes %  2.3   (0.0-12.0)  %


 


Eosinophils %  0.1   (0.00-5.0)  %


 


Basophils %  0.1   (0.0-0.4)  %


 


Absolute Granulocytes  7.39 H   (1.4-6.9)  


 


Basophils #  0.01   (0-0.4)  


 


Sodium    (137-145)  mmol/L


 


Potassium    (3.5-5.1)  mmol/L


 


Chloride    ()  mmol/L


 


Carbon Dioxide    (22-30)  mmol/L


 


Anion Gap    (5-15)  MEQ/L


 


BUN    (7-17)  mg/dL


 


Creatinine    (0.52-1.04)  mg/dL


 


Estimated GFR    ML/MIN


 


Glucose    ()  mg/dL


 


POC Glucometer   558 H*  (50 to 500)  mg/dL


 


Lactic Acid    (0.4-2.0)  


 


Calcium    (8.4-10.2)  mg/dL


 


Magnesium    (1.6-2.3)  mg/dL


 


Total Bilirubin    (0.2-1.3)  mg/dL


 


AST    (14-36)  U/L


 


ALT    (0-35)  U/L


 


Alkaline Phosphatase    ()  U/L


 


Serum Total Protein    (6.3-8.2)  g/dL


 


Albumin    (3.5-5.0)  g/dL


 


Urine Color    (YELLOW)  


 


Urine Appearance    (CLEAR)  


 


Urine pH    (5-6)  


 


Ur Specific Gravity    (1.005-1.025)  


 


Urine Protein    (Negative)  


 


Urine Ketones    (NEGATIVE)  


 


Urine Blood    (0-5)  Jose/ul


 


Urine Nitrite    (NEGATIVE)  


 


Urine Bilirubin    (NEGATIVE)  


 


Urine Urobilinogen    (0-1)  mg/dL


 


Ur Leukocyte Esterase    (NEGATIVE)  


 


Urine WBC (Auto)    (0-5)  /HPF


 


Urine RBC (Auto)    (0-2)  /HPF


 


U Epithel Cells (Auto)    (FEW)  /HPF


 


Urine Bacteria (Auto)    (NEGATIVE)  /HPF


 


U Non-Squamous Epi Cells    (FEW)  /HPF


 


Urine Mucus (Auto)    (NEGATIVE)  /HPF


 


Urine Yeast (Budding)    (NEGATIVE)  /HPF


 


Urine Culture Reflexed    (NO)  


 


Urine Glucose    (NEGATIVE)  mg/dL














- Progress


Progress: improved


Progress Note: 





11/16/21 21:01


Medical decision making: This patient is a 76-year-old female who has been in 

the emergency room at least once a week for the last few weeks for poorly 

controlled blood sugar issues and altered mental status.  The patient has severe

Alzheimer's dementia and is noncompliant in taking her medications and also 

refuses to eat at the nursing home.  Today she is in DKA I did talk to Dr. Miguel who is on as hospitalist today and I reviewed the patient history, 

condition, laboratory results as well as EKG results and we are going to bring 

her in treated for DKA and hydrate her.  We will recheck labs in the morning.  

Patient is a DNR status.  She also has a UTI that is significant and we will 

continue intravenous Rocephin for her.


Discussed with : Pasha


Counseled pt/family regarding: lab results, diagnosis





- Departure


Departure Disposition: In-patient Admission


Clinical Impression: 


 Altered mental status, Alzheimer's dementia, Sepsis, Hyperkalemia





Condition: Serious


Critical Care Time: No


Referrals: 


ERNESTO PORRAS MD [Primary Care Provider] - Follow up/PCP as directed

## 2021-11-17 LAB
ALBUMIN SERPL-MCNC: 2.4 G/DL (ref 3.5–5)
ALP SERPL-CCNC: 168 U/L (ref 38–126)
ALT SERPL-CCNC: 38 U/L (ref 0–35)
ANION GAP SERPL CALC-SCNC: 12.4 MEQ/L (ref 5–15)
ARTERIAL PATENCY WRIST A: (no result)
AST SERPL QL: 80 U/L (ref 14–36)
BASE EXCESS BLDA CALC-SCNC: 3.7 MMOL/L (ref -2–2)
BILIRUB BLD-MCNC: 0.6 MG/DL (ref 0.2–1.3)
BUN SERPL-MCNC: 27 MG/DL (ref 7–17)
CALCIUM SPEC-MCNC: 7.9 MG/DL (ref 8.4–10.2)
CHLORIDE SERPL-SCNC: 106 MMOL/L (ref 98–107)
CO2 SERPL-SCNC: 25 MMOL/L (ref 22–30)
COHGB BLD-MCNC: 5.4 % THGB (ref 0–6.9)
CREAT SERPL-MCNC: 1.31 MG/DL (ref 0.52–1.04)
GAS PNL BLDA: 11.5
GAS PNL BLDA: 37 C
GFR SERPLBLD BASED ON 1.73 SQ M-ARVRAT: 41.7 ML/MIN
GLUCOSE SERPL-MCNC: 219 MG/DL (ref 74–106)
HCO3 BLDA-SCNC: 26.1 MMOL/L (ref 22–28)
HCT VFR BLD AUTO: 32.6 % (ref 35–47)
HGB BLD-MCNC: 10.3 GM/DL (ref 12–16)
INHALED O2 CONCENTRATION: 40 %
MAGNESIUM SERPL-MCNC: 1.4 MG/DL (ref 1.6–2.3)
MCH RBC QN AUTO: 29.5 PG (ref 26–32)
MCHC RBC AUTO-ENTMCNC: 31.6 G/DL (ref 32–36)
METHGB MFR BLDA: 1.4 % (ref 1.4–1.5)
O2 A-A PPRESDIFF RESPIRATORY: 118 MM[HG]
PCO2 BLDA: 32 MMHG (ref 35–45)
PLATELET # BLD AUTO: 181 K/MM3 (ref 150–450)
PO2 BLDA: 127 MMHG (ref 75–100)
POTASSIUM BLDA-SCNC: 4.1 MMOL/L (ref 3.5–5.1)
POTASSIUM SERPLBLD-SCNC: 4.7 MMOL/L (ref 3.5–5.1)
PROT SERPL-MCNC: 4.9 G/DL (ref 6.3–8.2)
RBC # BLD AUTO: 3.49 M/MM3 (ref 4.1–5.4)
SAO2 % BLDA: 92.1 G/DF (ref 94–100)
SAO2 % BLDA: 98.8 % (ref 95–100)
SODIUM SERPL-SCNC: 139 MMOL/L (ref 137–145)
SPECIMEN SOURCE: (no result)
WBC # BLD AUTO: 4.5 K/MM3 (ref 4–10.5)

## 2021-11-17 RX ADMIN — FUROSEMIDE SCH: 10 INJECTION, SOLUTION INTRAMUSCULAR; INTRAVENOUS at 14:55

## 2021-11-17 RX ADMIN — FUROSEMIDE SCH MG: 10 INJECTION, SOLUTION INTRAMUSCULAR; INTRAVENOUS at 02:02

## 2021-11-17 RX ADMIN — ACETAMINOPHEN PRN MG: 650 SUPPOSITORY RECTAL at 13:24

## 2021-11-17 RX ADMIN — ACETAMINOPHEN PRN MG: 650 SUPPOSITORY RECTAL at 09:03

## 2021-11-17 RX ADMIN — ACETAMINOPHEN PRN MG: 650 SUPPOSITORY RECTAL at 02:09

## 2021-11-18 NOTE — PCM.SSS
History of Present Illness





- Chief Complaint


Chief Complaint: altered mental status. fever for 2-3 days


History of Present Illness: 


 is a 76 year old female.with history of hypertension, insulin-dependent

diabetes and Alzheimer's dementia and is a resident of [facility]

and presents with hyperglycemia of over 500.  Patient is a patient Dr. Porras.  

Patient is DNR.  In the last few weeks she has been to the emergency department 

several times because of poorly controlled blood sugar.  The last visit, a week 

ago, her blood sugar was measuring 20-30 on an Accu-Chek at the nursing home.  

Patient is also being treated with Cipro for a urinary tract infection.  The 

cultures were evaluated and the bacteria from that culture is sensitive to 

Cipro.  Patient was brought to the emergency department by EMS service.


Timing/Duration: today


Severity: moderate


Associated Symptoms: denies symptoms








- Review of Systems


Constitutional: Fever, Chills, Weakness


Eyes: No Symptoms


Ears, Nose, & Throat: No Symptoms


Respiratory: Orthopnea, Short Of Breath, No Cough


Cardiac: No Chest Pain, No Edema, No Syncope


Abdominal/Gastrointestinal: No Abdominal Pain, No Nausea, No Vomiting, No 

Diarrhea


Genitourinary Symptoms: No Dysuria


Musculoskeletal: No Back Pain, No Neck Pain


Skin: No Rash


Neurological: Irritability, Lethargy, No Dizziness, No Focal Weakness, No 

Sensory Changes


Psychological: No Symptoms


Endocrine: No Symptoms


Hematologic/Lymphatic: No Symptoms


Immunological/Allergic: No Symptoms





Medications & Allergies


Home Medications: 


                              Home Medication List





Acetaminophen 650 mg*** [Feverall 650 mg***] 650 mg MA Q4H PRN PRN  supp.rect 

11/17/21 [Rx]


Glucagon 1 mg*** [GlucaGen 1 MG***] 1 mg IM UD PRN 11/17/21 [History Confirmed 

11/17/21]


Magnesium Hydroxide 30 ml*** [Milk of Magnesia 30 ml***] 30 ml PO DAILY PRN PRN 

11/17/21 [History Confirmed 11/17/21]








Allergies/Adverse Reactions: 


                                    Allergies











Allergy/AdvReac Type Severity Reaction Status Date / Time


 


No Known Drug Allergies Allergy   Verified 11/17/21 00:21














- Past Medical History


Past Medical History: Yes


Neurological History: Alzheimer's Disease, Dementia, Stroke


ENT History: Cataracts


Cardiac History: High Cholesterol, Hypertension


Respiratory History: CHF


Endocrine Medical History: Diabetes Type I


Musculoskelatal History: Osteoarthritis


GI Medical History: No Pertinent History


 History: No Pertinent History


Pyscho-Social History: Depression


Reproductive Disorders: No Pertinent History


Comment: PER [facility] RECORDS





- Female History


Are you pregnant now?: No





- Past Surgical History


Past Surgical History: No


Neuro Surgical History: No Pertinent History


Cardiac History: No Pertinent History


Respiratory Surgery: No Pertinent History


GI Surgical History: No Pertinent History


Genitourinary Surgical Hx: No Pertinent History


Musculskeletal Surgical Hx: No Pertinent History


Female Surgical History: No Pertinent History


Other Surgical History: PER [facility] RECORDS





- Social History


Smoking Status: Unknown if ever smoked


Exposure to second hand smoke: No


Alcohol: None


Drug Use: none


Significant Family History: no pertinent family hx





- Physical Exam


Vital Signs: 


                               Vital Signs - 24 hr











  Temp Pulse Resp BP Pulse Ox


 


 11/17/21 16:00  100.1 F  115 H  20  64/40  95


 


 11/17/21 12:00    20  


 


 11/17/21 11:58  99.7 F  96 H  20  98/71  100











General Appearance: moderate distress, lethargy


Neurologic Exam: sensation nml, disoriented, confusion, No motor deficits


Eye Exam: PERRL/EOMI, eyes nml inspection


Ears, Nose, Throat Exam: normal ENT inspection, TMs normal, pharynx normal, 

moist mucous membranes


Neck Exam: normal inspection, non-tender, supple, full range of motion


Respiratory Exam: diminished breath sounds, crackles/rales, rhonchi, wheezing, 

No respiratory distress


Cardiovascular Exam: regular rate/rhythm, normal heart sounds, normal peripheral

pulses


Gastrointestinal/Abdomen Exam: soft, normal bowel sounds, No tenderness, No mass


Back Exam: normal inspection, normal range of motion, No CVA tenderness, No 

vertebral tenderness


Extremity Exam: normal inspection, normal range of motion, pelvis stable


Skin Exam: normal color, warm, dry, No rash


Lymphatic Exam: No adenopathy





Results





- Labs


Lab/Micro Results: 


                            Lab Results-Last 24 Hours











  11/17/21 Range/Units





  11:53 


 


POC Glucometer  115 H  (74 to 106)  mg/dL








                                  Microbiology











 11/16/21 14:06 Urine Culture - Preliminary





 Catherized    NO GROWTH TO DATE








                                   Accuchecks











Date                           11/17/21


 


Time                           11:58

















Assessment/Plan


(1) Altered mental status


Status: Acute   


Qualifiers: 


   Altered mental status type: delirium   Qualified Code(s): R41.0 - 

Disorientation, unspecified   


Code(s): R41.82 - ALTERED MENTAL STATUS, UNSPECIFIED   





(2) Alzheimer's dementia


Status: Acute   


Qualifiers: 


   Alzheimer's disease onset: early-onset 


Code(s): G30.9 - ALZHEIMER'S DISEASE, UNSPECIFIED; F02.80 - DEMENTIA IN OTH 

DISEASES CLASSD ELSWHR W/O BEHAVRL DISTURB   





(3) Hyperosmolar hyperglycemic coma due to diabetes mellitus without 

ketoacidosis


Status: Acute   Code(s): E11.01 - TYPE 2 DIABETES MELLITUS WITH HYPEROSMOLARITY 

WITH COMA   





(4) Sepsis


Status: Acute   


Qualifiers: 


   Sepsis type: sepsis due to unspecified organism   Sepsis acute organ 

dysfunction status: with acute organ dysfunction   Severe sepsis acute organ 

dysfunction type: acute renal failure   Acute renal failure type: unspecified   

Severe sepsis shock status: with septic shock   Qualified Code(s): A41.9 - 

Sepsis, unspecified organism; R65.21 - Severe sepsis with septic shock; N17.9 - 

Acute kidney failure, unspecified   





(5) UTI (urinary tract infection)


Status: Acute   Code(s): N39.0 - URINARY TRACT INFECTION, SITE NOT SPECIFIED   





Hospital Summary





- Hospital Course


Hospital Course: 





                                 Chief Complaint





Diagnosis                        sepsis, hyperkalemia, DKA





                                    Allergies











Allergy/AdvReac Type Severity Reaction Status Date / Time


 


No Known Drug Allergies Allergy   Verified 11/17/21 00:21








                           Vital Signs (Last 24 hours)











  Temp Pulse Resp BP Pulse Ox


 


 11/17/21 16:00  100.1 F  115 H  20  64/40  95


 


 11/17/21 12:00    20  


 


 11/17/21 11:58  99.7 F  96 H  20  98/71  100








                                Home Medications











 Medication  Instructions  Recorded  Confirmed  Last Taken  Type


 


Acetaminophen 650 mg*** [Feverall 650 mg MA Q4H PRN PRN  supp.rect 11/17/21  

Unknown Rx





650 mg***]     


 


Glucagon 1 mg*** [GlucaGen 1 MG***] 1 mg IM UD PRN 11/17/21 11/17/21 Unknown 

History


 


Magnesium Hydroxide 30 ml*** [Milk 30 ml PO DAILY PRN PRN 11/17/21 11/17/21 

Unknown History





of Magnesia 30 ml***]     








                               Current Medications














Discontinued Medications














Generic Name Dose Route Start Last Admin





  Trade Name Freq  PRN Reason Stop Dose Admin


 


Acetaminophen  650 mg  11/16/21 23:55  11/17/21 13:24





  Acetaminophen 650 Mg Supp.Rect  MA  12/16/21 23:54  650 mg





  Q4H PRN PRN   Administration





  PAIN AND/OR FEVER  


 


Acetaminophen  650 mg  11/17/21 12:32 





  Acetaminophen 325 Mg Tablet  PO  12/17/21 12:31 





  Q4H PRN PRN  





  PAIN OR FEVER  


 


Aspirin  81 mg  11/17/21 13:00  11/17/21 13:22





  Aspirin 81 Mg Tablet.Ec  PO  12/17/21 12:59  Not Given





  DAILY ALLEN  


 


Calcium Chloride  1,000 mg  11/16/21 21:04  11/16/21 22:54





  Calcium Chloride 100 Mg/Ml 10ml Inj.  IV  11/16/21 21:05  1,000 mg





  STAT ONE   Administration


 


Calcium Chloride  Confirm  11/16/21 22:52 





  Calcium Chloride 100 Mg/Ml 10ml Inj.  Administered  11/16/21 22:53 





  Dose  





  1,000 mg  





  .ROUTE  





  .STK-MED ONE  


 


Calcium Chloride  1,000 mg  11/17/21 05:00  11/17/21 05:39





  Calcium Chloride 100 Mg/Ml 10ml Inj.  IV  11/17/21 05:01  1,000 mg





  STAT ONE   Administration


 


Calcium Gluconate  Confirm  11/17/21 04:56 





  Calcium Gluconate 1000 Mg/10 Ml Vial  Administered  11/17/21 04:57 





  Dose  





  1,000 mg  





  IV  





  .STK-MED ONE  


 


Ceftriaxone Sodium  1,000 mg  11/16/21 16:20  11/16/21 18:49





  Ceftriaxone Sodium 1000 Mg Inj Vial  IM  11/16/21 16:21  Not Given





  STAT ONE  


 


Clonidine  0.2 mg  11/17/21 13:00  11/17/21 13:22





  Clonidine Hcl 0.1 Mg Tablet  PO  12/17/21 12:59  Not Given





  BID ALLEN  


 


Donepezil HCl  10 mg  11/17/21 22:00 





  Donepezil Hcl 10 Mg Tablet  PO  12/17/21 21:59 





  QHS ALLEN  


 


Furosemide  40 mg  11/17/21 02:00  11/17/21 14:55





  Furosemide 40 Mg/4 Ml Vial  IV  12/17/21 01:59  Not Given





  Q12H ALLEN  


 


Glucagon  1 mg  11/17/21 12:32 





  Glucagon 1 Mg/Vial Vial  IM  12/17/21 12:31 





  UD PRN  





  BS < 70 and non-responsive  


 


Hydralazine HCl  12.5 mg  11/17/21 15:00  11/17/21 14:56





  Hydralazine Hcl 25 Mg Tablet  PO  12/17/21 14:59  Not Given





  TID ALLEN  


 


Sodium Chloride  1,000 mls @ 100 mls/hr  11/16/21 13:15  11/17/21 04:42





  Sodium Chloride 0.9% 1000 Ml  IV  12/16/21 13:14  Not Given





  .Q10H ALLEN  


 


Ceftriaxone Sodium/Dextrose  Confirm  11/16/21 18:28 





  Rocephin 1 Gm-D5w 50 Ml Bag**  Administered  11/16/21 18:29 





  Dose  





  1 g in 50 mls @ ud  





  IV  





  .STK-MED ONE  


 


Ceftriaxone Sodium/Dextrose  1 g in 50 mls @ 100 mls/hr  11/16/21 18:47  

11/16/21 19:36





  Rocephin 1 Gm-D5w 50 Ml Bag**  IV  11/16/21 19:16  Infused





  STAT STA   Infusion


 


Sodium Chloride  1,000 mls @ 999 mls/hr  11/16/21 19:23  11/16/21 22:28





  Sodium Chloride 0.9% 1000 Ml  IV  11/16/21 20:23  Infused





  .Q1H1M STA   Infusion


 


Sodium Chloride  1,000 mls @ 999 mls/hr  11/16/21 20:09  11/16/21 22:28





  Sodium Chloride 0.9% 1000 Ml  IV  11/16/21 21:09  Infused





  .Q1H1M STA   Infusion


 


Sodium Chloride  Confirm  11/16/21 22:53 





  Sodium Chloride 0.9% 100 Ml Bag  Administered  11/16/21 22:54 





  Dose  





  100 mls @ ud  





  .ROUTE  





  .STK-MED ONE  


 


Sodium Chloride  Confirm  11/16/21 13:59 





  Sodium Chloride 0.9% 1000 Ml  Administered  11/16/21 14:00 





  Dose  





  1,000 mls @ ud  





  .ROUTE  





  .STK-MED ONE  


 


Sodium Chloride  Confirm  11/16/21 19:27 





  Sodium Chloride 0.9% 1000 Ml  Administered  11/16/21 19:28 





  Dose  





  1,000 mls @ ud  





  .ROUTE  





  .STK-MED ONE  


 


Sodium Chloride  Confirm  11/16/21 20:18 





  Sodium Chloride 0.9% 1000 Ml  Administered  11/16/21 20:19 





  Dose  





  1,000 mls @ ud  





  .ROUTE  





  .STK-MED ONE  


 


Sodium Chloride  1,000 mls @ 100 mls/hr  11/16/21 23:55  11/17/21 16:02





  Sodium Chloride 0.9% 1000 Ml  IV  12/16/21 23:54  Not Given





  .Q10H [facility]  


 


Ceftriaxone Sodium/Dextrose  2 g in 50 mls @ 100 mls/hr  11/17/21 22:00 





  Rocephin 2 Gm-D5w 50ml Bag**  IV  11/20/21 21:59 





  Q24H22 ALLEN  


 


Sodium Chloride  Confirm  11/17/21 05:30 





  Sodium Chloride 0.9% 100 Ml Bag  Administered  11/17/21 05:31 





  Dose  





  100 mls @ ud  





  .ROUTE  





  .STK-MED ONE  


 


Insulin Glargine  22 unit  11/17/21 22:00 





  Insulin Glargine** 1 Unit  SQ  12/17/21 21:59 





  QHS [facility]  


 


Insulin Human Lispro  0 unit  11/17/21 08:00  11/17/21 07:46





  Insulin Lispro 1 Unit  SQ  12/17/21 07:59  6 unit





  UD PRN   Administration





  HYPERGLYCEMIA  


 


Insulin Human Regular  15 unit  11/16/21 13:28  11/16/21 14:05





  Insulin Regular, Human 1 Unit  IV  11/16/21 13:29  15 unit





  STAT ONE   Administration


 


Insulin Human Regular  Confirm  11/16/21 13:59 





  Insulin Regular, Human 1 Unit  Administered  11/16/21 14:00 





  Dose  





  15 unit  





  .ROUTE  





  .STK-MED ONE  


 


Insulin Human Regular  20 unit  11/16/21 15:41  11/16/21 18:48





  Insulin Regular, Human 1 Unit  IV  11/16/21 15:42  20 unit





  STAT ONE   Administration


 


Insulin Human Regular  Confirm  11/16/21 18:27 





  Insulin Regular, Human 1 Unit  Administered  11/16/21 18:28 





  Dose  





  20 unit  





  .ROUTE  





  .STK-MED ONE  


 


Lisinopril  40 mg  11/17/21 13:00  11/17/21 13:23





  Lisinopril 20 Mg Tablet  PO  12/17/21 12:59  Not Given





  DAILY ALLEN  


 


Magnesium Hydroxide  30 ml  11/17/21 12:32 





  Magnesium Hydroxide 30 Ml Udcup  PO  12/17/21 12:31 





  DAILY PRN PRN  





  CONSTIPATION  


 


Magnesium Oxide  200 mg  11/17/21 13:00  11/17/21 13:23





  Magnesium Oxide 400 Mg Tablet  PO  12/17/21 12:59  Not Given





  DAILY ALLEN  


 


Memantine  5 mg  11/17/21 13:00  11/17/21 13:23





  Memantine Hcl 5 Mg Tablet  PO  12/17/21 12:59  Not Given





  BID ALLEN  


 


Metoprolol Succinate  50 mg  11/17/21 13:00  11/17/21 13:23





  Metoprolol Succinate 50 Mg Tablet.Sa  PO  12/17/21 12:59  Not Given





  BID ALLEN  


 


Multivitamins Therapeutic  1 tab  11/17/21 13:00  11/17/21 13:23





  Multivitamins,Therapeutic 1 Tab Tab  PO  12/17/21 12:59  Not Given





  DAILY [facility]  


 


Multivitamins/Minerals  1 tab  11/17/21 13:00  11/17/21 13:23





  Beta-Carotene(A) W-C And E/Min 1 Tab Tablet  PO  12/17/21 12:59  Not Given





  DAILY ALLEN  


 


Nitroglycerin  0.4 mg  11/17/21 12:45 





  Nitroglycerin 0.4 Mg Tablet Bottle  SL  12/17/21 12:44 





  UD PRN  


 


Ondansetron HCl  Confirm  11/16/21 18:29 





  Ondansetron Hcl 4 Mg/2 Ml Vial  Administered  11/16/21 18:30 





  Dose  





  4 mg  





  .ROUTE  





  .STK-MED ONE  


 


Ondansetron HCl  4 mg  11/16/21 18:50  11/16/21 18:50





  Ondansetron Hcl 4 Mg/2 Ml Vial  IV  11/16/21 18:51  4 mg





  STAT ONE   Administration


 


Ondansetron HCl  4 mg  11/17/21 12:44 





  Zofran 4 Mg/Udtablet Orally Disintegrating  PO  12/17/21 12:43 





  Q6H PRN PRN  





  NAUSEA/VOMITING  


 


Potassium Chloride  10 meq  11/17/21 13:00  11/17/21 13:23





  Potassium Chloride 10 Meq Tablet  PO  12/17/21 12:59  Not Given





  BID ALLEN  


 


Sertraline HCl  50 mg  11/17/21 22:00 





  Sertraline Hcl 50 Mg Tab  PO  12/17/21 21:59 





  QHS ALLEN  


 


Simvastatin  40 mg  11/17/21 22:00 





  Simvastatin 20 Mg Tablet  PO  12/17/21 21:59 





  QHS ALLEN  


 


Tramadol HCl  50 mg  11/17/21 12:32 





  Tramadol Hcl 50 Mg Tablet  PO  12/17/21 12:31 





  Q6H PRN PRN  





  PAIN  








                         Intake & Output (Last 24 hours)











 11/15/21 11/16/21 11/17/21 11/18/21





 11:59 11:59 11:59 11:59


 


Intake Total   267 0


 


Output Total   500 


 


Balance   -233 0


 


Weight   47.8 kg 








                      Microbiology Results (Last 24 hours)





11/16/21 14:06   Catherized   Urine Culture - Preliminary


                            NO GROWTH TO DATE





                       Laboratory Results (Last 24 hours)











  11/17/21





  11:53


 


POC Glucometer  115 H








                             Orders (Last 24 hours)











 Category Date Time Status


 


 Nutritional Admission Screen ONCE Diet  11/17/21 09:00 Completed


 


 POCT GLUCOSE Stat Lab  11/17/21 07:21 Completed


 


 POCT GLUCOSE Stat Lab  11/17/21 11:53 Completed


 


 Acetaminophen 325 mg*** [Tylenol 325 mg***] Med  11/17/21 12:32 Discontinued





 650 mg PO Q4H PRN PRN   


 


 Aspirin EC 81 mg*** [Ecotrin 81 mg***] Med  11/17/21 13:00 Discontinued





 81 mg PO DAILY   


 


 Beta-Carotene(A) W-C & E/Min** [Ocuvite Tablet***] Med  11/17/21 13:00 

Discontinued





 1 tab PO DAILY   


 


 Ceftriaxone 2 GM/50 ML PREMIX* [ROCEPHIN 2 Gm-D5w 50ML Med  11/17/21 22:00 

Discontinued





 BAG**]   





 2 g in 50 ml IV Q24H22   


 


 Clonidine HCl 0.1 mg*** [Catapres 0.1 MG***] Med  11/17/21 13:00 Discontinued





 0.2 mg PO BID   


 


 Donepezil HCl 10 mg*** [Aricept 10 MG***] Med  11/17/21 22:00 Discontinued





 10 mg PO QHS   


 


 Glucagon 1 mg*** [GlucaGen 1 MG***] Med  11/17/21 12:32 Discontinued





 1 mg IM UD PRN   


 


 HydrALAzine HCL 25 MG TAB*** [Apresoline 25 MG TABLET** Med  11/17/21 15:00 

Discontinued





 *]   





 12.5 mg PO TID   


 


 Insulin Glargine** [Lantus Insulin**] Med  11/17/21 22:00 Discontinued





 22 unit SQ QHS   


 


 Insulin Lispro [Humalog] Med  11/17/21 08:00 Discontinued





 See Dose Instructions  SQ UD PRN   


 


 Lisinopril 20 mg*** [Zestril 20 MG***] Med  11/17/21 13:00 Discontinued





 40 mg PO DAILY   


 


 Magnesium Hydroxide 30 ml*** [Milk of Magnesia 30 ml*** Med  11/17/21 12:32 

Discontinued





 ]   





 30 ml PO DAILY PRN PRN   


 


 Magnesium Oxide 400 mg*** [Mag-Ox 400***] Med  11/17/21 13:00 Discontinued





 200 mg PO DAILY   


 


 Memantine HCl 5 mg*** [Namenda 5 MG***] Med  11/17/21 13:00 Discontinued





 5 mg PO BID   


 


 Metoprolol Succinate 50 mg*** [Toprol Xl 50 MG***] Med  11/17/21 13:00 

Discontinued





 50 mg PO BID   


 


 Multivitamins,Therapeutic Tab* [Theragran Multivitamin* Med  11/17/21 13:00 

Discontinued





 **]   





 1 tab PO DAILY   


 


 Nitroglycerin 0.4 mg Tablet*** [Nitrostat 0.4 MG Tablet Med  11/17/21 12:45 

Discontinued





 ***]   





 0.4 mg SL UD PRN   


 


 Ondansetron ODT 4 MG*** [Zofran Odt 4 mg***] Med  11/17/21 12:44 Discontinued





 4 mg PO Q6H PRN PRN   


 


 Potassium Chloride 10 Meq Tab* [Klor Con 10 MEQ***] Med  11/17/21 13:00 

Discontinued





 10 meq PO BID   


 


 Sertraline HCl 50 mg** [Zoloft 50 mg Tablet**] Med  11/17/21 22:00 Discontinued





 50 mg PO QHS   


 


 Simvastatin 20Mg** [Zocor 20Mg**] Med  11/17/21 22:00 Discontinued





 40 mg PO QHS   


 


 Tramadol HCl 50 mg*** [Ultram 50 mg***] Med  11/17/21 12:32 Discontinued





 50 mg PO Q6H PRN PRN   








                       Patient Care Notes (Last 24 hours)





11/17/21 16:50 Nursing Note by Perla Cohen


Patient taken by ambulance back to [facility]. This nurse spoke with patient's

daughter Patricia to notify of transfer.





Initialized on 11/17/21 16:50 - END OF NOTE








11/17/21 15:55 Nursing Note by Perla Cohen


Spoke with Dr. Jaimes about patient's IV leaking and would not infuse. Patient 

is very edematous and would not be able to get IV access peripherally at this 

time. VS and patient status reported to physician. Physician stated there was 

nothing more we could do at this stage and would be best to return to facility 

with comfort care. This nurse along with house supervisor spoke with patient's 

daughter about patient's status and what the physician felt was best for the 

patient. Daughter, Patricia Foster, tearful but agreeable with plan. This nurse 

spoke with Judy at [facility] and let her know patient would be returning, 

plan, patient status, and the daughter was aware. Awaiting ambulance 

transportation.





Initialized on 11/17/21 15:55 - END OF NOTE








11/17/21 09:54 Case Management Note by Estee Richards


PT WAS PLACED UNDER CARE OF DR PORRAS, BUT DR PORRAS REPORTS THAT SHE IS NOT HIS 

PATIENT.  DR JAIMES ALSO REPORTS THAT SHE IS NOT HIS PATIENT.  CALL TO 

[place] TO SEE ABOUT FINDING PT'S PCP.  SPOKE WITH GERRY, WHO REPORTS THAT

ACCORDING TO THEIR RECORDS, PT CAME FROM [facility] IN [place].  SHE SEES DR WARD FOR DIABETES, AND DR ALVARADO.  GERRY REPORTS THAT DR BRIE BAUER IS

THEIR NEW ROUNDING PHYSICIAN.  





Patient condition is very poor. Families are advised about hospice. they agreed.

They want her to be comfortable at [facility]. Will transfer back to NH.

















- Vitals & Intake/Output


Vital Signs: 


                                   Vital Signs











Temperature  100.1 F   11/17/21 16:00


 


Pulse Rate  115 H  11/17/21 16:00


 


Respiratory Rate  20   11/17/21 16:00


 


Blood Pressure  64/40   11/17/21 16:00


 


O2 Sat by Pulse Oximetry  95   11/17/21 16:00











Intake & Output: 


                                 Intake & Output











 11/15/21 11/16/21 11/17/21 11/18/21





 11:59 11:59 11:59 11:59


 


Intake Total   267 0


 


Output Total   500 


 


Balance   -233 0


 


Weight   47.8 kg 














- Lab


Result Diagrams: 


                                 11/17/21 05:20





                                 11/17/21 05:20


Lab Results-Last 24 Hrs: 


                            Lab Results-Last 24 Hours











  11/17/21 Range/Units





  11:53 


 


POC Glucometer  115 H  (74 to 106)  mg/dL











Micro Results-Entire Visit: 


                                  Microbiology











 11/16/21 14:06 Urine Culture - Preliminary





 Catherized    NO GROWTH TO DATE








                                   Accuchecks











Date                           11/17/21


 


Time                           11:58

















- Procedures and Test


Procedures and Tests throughout Hospitalization: 


                            Therapy Orders & Screens





11/16/21 23:55


Oxygen Nasal Cannula 2 lpm 


   Comment: 





11/17/21 03:36


PT Screen per Nursing Assess ONCE 


   Comment: Protocol Order


   Physician Instructions: Greater than 3 points order PT Admission Screenin


   Reason For Exam: Triggered on Admission


   Diagnosis: sepsis, hyperkalemia, DKA


   Open Wound/Cellutlitis/Pressure Ulcers: Yes


   Acute Fx/ORIF/Change in wt bearing status: No


   Severe MUSCULOSKELETAL pain: Yes


   ADL Dysfunction: Yes


   Acute CVA w/Hemiparesis/Hemiplegia: No


   Decreased Functional Mobility/Strength: Yes


   Sprain/Strain: No


   Acute Post-op Mobility Dysfunction: No


   Total Points: 14


ST Screen per Nursing Assess ONCE 


   Comment: Protocol Order


   Physician Instructions: Greater than 5 points order ST Admission Screening


   Reason For Exam: Triggered on Admission


   Diagnosis: sepsis, hyperkalemia, DKA


   CVA/Dyshpagia/Aphasia: Yes


   Cognitive Deficits: Yes


   Dehydration/Nutrition Deficit: No


   Reflux: No


   Oral-Motor Difficulties: No


   Pneumonia: No


   Nursing Home Resident: Yes


   Total Points: 13














- Discharge


Discharge Date: 11/17/21


Disposition: Skilled Care @ [facility]


Condition: Fair


Prescriptions: 


New


   Acetaminophen 650 mg*** [Feverall 650 mg***] 650 mg MA Q4H PRN PRN  supp.rect


     PRN Reason: Pain And/Or Fever





Continue


   Magnesium Hydroxide 30 ml*** [Milk of Magnesia 30 ml***] 30 ml PO DAILY PRN 

PRN


     PRN Reason: Constipation


   Glucagon 1 mg*** [GlucaGen 1 MG***] 1 mg IM UD PRN


     PRN Reason: BS < 70 and non-responsive





Discontinued


   Acetaminophen [Tylenol] 650 mg PO Q4H PRN PRN


     PRN Reason: PAIN OR FEVER


   Ondansetron HCl [Zofran] 4 mg PO Q6H PRN PRN


     PRN Reason: Nausea/Vomiting


   Nitroglycerin 0.4 mg Tablet*** [Nitrostat 0.4 MG Tablet***] 0.4 mg SL UD


   Sodium Phosphate,Mono-Dibasic [Fleet Enema] 1 enema MA DAILY PRN PRN


     PRN Reason: Constipation


   Magnesium Oxide 200 mg PO DAILY


   Lisinopril 40 mg PO DAILY


   Vit A/Vit C/Vit E/Zinc/Copper [Healthy Eyes Supervision tgl] 1 cap PO DAILY


   Atorvastatin Calcium 40 mg PO QHS


   Aspirin EC 81 mg*** [Ecotrin 81 mg***] 81 mg PO DAILY


   Donepezil HCl 10 mg*** [Aricept 10 MG***] 10 mg PO QHS


   Hydralazine HCl 10 mg PO TID


   Memantine HCl 5 mg*** [Namenda 5 MG***] 5 mg PO BID


   Metoprolol Succinate 50 mg PO BID


   Clonidine HCl 0.2 mg PO BID


   Sertraline HCl 50 mg** [Zoloft 50 mg Tablet**] 50 mg PO QHS


   Potassium Chloride 10 meq PO BID


   Multivit,Calc,Mins/Iron/Folic [Therems-M Tablet] 1 tab PO DAILY


   Insulin Lispro [Humalog] 1 unit SQ UD


   Tramadol HCl 50 mg*** [Ultram 50 mg***] 50 mg PO Q6H PRN PRN


     PRN Reason: Pain


   Insulin Detemir [Levemir] 22 units SQ QHS


Instructions:  Palliative Care


Additional Instructions: 


Patient to return to [place] for comfort care under Dr. Bauer.


Follow up with: 


BRIE BAUER MD [Primary Care Provider] - 


Forms:  Ambulance Transport Record, Transfer Record Nursing Home

## 2021-11-22 VITALS — DIASTOLIC BLOOD PRESSURE: 40 MMHG | HEART RATE: 115 BPM | OXYGEN SATURATION: 95 % | SYSTOLIC BLOOD PRESSURE: 64 MMHG
